# Patient Record
Sex: FEMALE | Race: WHITE | NOT HISPANIC OR LATINO | URBAN - METROPOLITAN AREA
[De-identification: names, ages, dates, MRNs, and addresses within clinical notes are randomized per-mention and may not be internally consistent; named-entity substitution may affect disease eponyms.]

---

## 2023-09-26 PROBLEM — J00 ACUTE NASOPHARYNGITIS: Status: ACTIVE | Noted: 2019-12-27

## 2023-09-26 PROBLEM — E78.00 PURE HYPERCHOLESTEROLEMIA, UNSPECIFIED: Status: ACTIVE | Noted: 2018-07-10

## 2023-09-26 PROBLEM — K90.9 INTESTINAL MALABSORPTION (HHS-HCC): Status: ACTIVE | Noted: 2023-09-26

## 2023-09-26 PROBLEM — M25.511 RIGHT SHOULDER PAIN: Status: ACTIVE | Noted: 2021-06-25

## 2023-09-26 PROBLEM — M19.90 OSTEOARTHRITIS: Status: ACTIVE | Noted: 2023-01-17

## 2023-09-26 PROBLEM — K59.00 CONSTIPATION: Status: ACTIVE | Noted: 2023-09-26

## 2023-09-26 PROBLEM — R73.01 IMPAIRED FASTING GLUCOSE: Status: ACTIVE | Noted: 2019-03-06

## 2023-09-26 PROBLEM — J02.9 ACUTE PHARYNGITIS: Status: ACTIVE | Noted: 2021-06-30

## 2023-09-26 PROBLEM — T23.009A BURN OF HAND: Status: ACTIVE | Noted: 2023-09-26

## 2023-09-26 PROBLEM — Z96.651 STATUS POST RIGHT KNEE REPLACEMENT: Status: ACTIVE | Noted: 2023-09-26

## 2023-09-26 PROBLEM — S46.811A STRAIN OF RIGHT TRAPEZIUS MUSCLE: Status: ACTIVE | Noted: 2023-04-18

## 2023-09-26 PROBLEM — I10 PRIMARY HYPERTENSION: Status: ACTIVE | Noted: 2018-07-02

## 2023-09-26 PROBLEM — E55.9 VITAMIN D DEFICIENCY: Status: ACTIVE | Noted: 2023-09-26

## 2023-09-26 PROBLEM — F41.8 DEPRESSION WITH ANXIETY: Status: ACTIVE | Noted: 2018-11-09

## 2023-09-26 PROBLEM — K85.90 PANCREATITIS (HHS-HCC): Status: ACTIVE | Noted: 2021-05-15

## 2023-09-26 PROBLEM — R05.9 COUGH: Status: ACTIVE | Noted: 2019-12-27

## 2023-09-26 PROBLEM — R15.9 FECAL INCONTINENCE: Status: ACTIVE | Noted: 2021-04-16

## 2023-09-26 PROBLEM — J06.9 VIRAL UPPER RESPIRATORY INFECTION: Status: ACTIVE | Noted: 2020-10-23

## 2023-09-26 PROBLEM — N89.8 VAGINAL DISCHARGE: Status: ACTIVE | Noted: 2020-10-02

## 2023-09-26 PROBLEM — R74.8 ELEVATED LIPASE: Status: ACTIVE | Noted: 2021-05-15

## 2023-09-26 PROBLEM — R39.9 UTI SYMPTOMS: Status: ACTIVE | Noted: 2021-08-19
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
## 2023-09-26 PROBLEM — G47.00 INSOMNIA: Status: ACTIVE | Noted: 2018-07-02

## 2023-09-26 PROBLEM — R03.0 ELEVATED BLOOD PRESSURE READING: Status: ACTIVE | Noted: 2022-09-30

## 2023-09-26 PROBLEM — R12 HEART BURN: Status: ACTIVE | Noted: 2023-07-14

## 2023-09-26 PROBLEM — R73.01 ABNORMAL FASTING GLUCOSE: Status: ACTIVE | Noted: 2018-07-02

## 2023-09-26 PROBLEM — N39.0 UTI (URINARY TRACT INFECTION): Status: ACTIVE | Noted: 2021-08-19

## 2023-09-26 PROBLEM — E66.9 OBESITY: Status: ACTIVE | Noted: 2019-03-06

## 2023-09-26 PROBLEM — D62 ANEMIA DUE TO ACUTE BLOOD LOSS: Status: ACTIVE | Noted: 2023-09-26

## 2023-09-26 PROBLEM — B02.9 SHINGLES: Status: ACTIVE | Noted: 2018-08-17

## 2023-09-26 PROBLEM — K21.9 GASTROESOPHAGEAL REFLUX DISEASE: Status: ACTIVE | Noted: 2023-07-14

## 2023-09-26 PROBLEM — K58.9 IRRITABLE BOWEL: Status: ACTIVE | Noted: 2021-04-16

## 2023-09-26 PROBLEM — R92.8 ABNORMAL MAMMOGRAM: Status: ACTIVE | Noted: 2023-07-06

## 2023-09-26 PROBLEM — G47.33 OBSTRUCTIVE APNEA: Status: ACTIVE | Noted: 2019-07-17

## 2023-09-26 RX ORDER — ASPIRIN 81 MG/1
TABLET ORAL
COMMUNITY
Start: 2021-08-31 | End: 2023-11-06 | Stop reason: WASHOUT

## 2023-09-26 RX ORDER — CYCLOBENZAPRINE HCL 5 MG
TABLET ORAL
COMMUNITY
Start: 2023-04-18 | End: 2023-11-06 | Stop reason: WASHOUT

## 2023-09-26 RX ORDER — ASPIRIN 325 MG
TABLET, DELAYED RELEASE (ENTERIC COATED) ORAL
COMMUNITY
Start: 2021-01-20 | End: 2023-11-06 | Stop reason: WASHOUT

## 2023-09-26 RX ORDER — ACETAMINOPHEN, DIPHENHYDRAMINE HCL, PHENYLEPHRINE HCL 325; 25; 5 MG/1; MG/1; MG/1
0.25 TABLET ORAL NIGHTLY
COMMUNITY
End: 2024-02-22 | Stop reason: WASHOUT

## 2023-09-26 RX ORDER — BIOTIN 10 MG
1 TABLET ORAL DAILY
COMMUNITY

## 2023-09-26 RX ORDER — OMEPRAZOLE 40 MG/1
40 CAPSULE, DELAYED RELEASE ORAL DAILY
COMMUNITY
Start: 2023-07-14 | End: 2023-12-11 | Stop reason: ALTCHOICE

## 2023-09-26 RX ORDER — VENLAFAXINE 50 MG/1
50 TABLET ORAL
COMMUNITY
Start: 2017-02-27 | End: 2023-11-06 | Stop reason: WASHOUT

## 2023-09-26 RX ORDER — ASCORBIC ACID 500 MG
500 TABLET ORAL DAILY
COMMUNITY
End: 2023-11-06 | Stop reason: WASHOUT

## 2023-09-26 RX ORDER — ONDANSETRON 4 MG/1
TABLET, ORALLY DISINTEGRATING ORAL
COMMUNITY
Start: 2021-05-14 | End: 2023-11-06 | Stop reason: WASHOUT

## 2023-09-26 RX ORDER — VENLAFAXINE HYDROCHLORIDE 150 MG/1
150 CAPSULE, EXTENDED RELEASE ORAL DAILY
COMMUNITY
End: 2023-12-11 | Stop reason: ALTCHOICE

## 2023-09-26 RX ORDER — SULFAMETHOXAZOLE AND TRIMETHOPRIM 800; 160 MG/1; MG/1
TABLET ORAL
COMMUNITY
Start: 2021-08-19 | End: 2023-11-06 | Stop reason: WASHOUT

## 2023-09-26 RX ORDER — IBUPROFEN 600 MG/1
TABLET ORAL
COMMUNITY
Start: 2020-12-08 | End: 2023-11-06 | Stop reason: WASHOUT

## 2023-09-26 RX ORDER — ZOLPIDEM TARTRATE 5 MG/1
5 TABLET ORAL NIGHTLY PRN
COMMUNITY
End: 2023-11-06 | Stop reason: WASHOUT

## 2023-09-26 RX ORDER — SOD SULF/POT CHLORIDE/MAG SULF 1.479 G
TABLET ORAL
COMMUNITY
Start: 2021-06-16 | End: 2023-11-06 | Stop reason: WASHOUT

## 2023-09-26 RX ORDER — FERROUS SULFATE 325(65) MG
1 TABLET, DELAYED RELEASE (ENTERIC COATED) ORAL DAILY
COMMUNITY
End: 2023-11-06 | Stop reason: WASHOUT

## 2023-09-26 RX ORDER — CLONAZEPAM 1 MG/1
1 TABLET ORAL NIGHTLY
COMMUNITY
End: 2023-11-17 | Stop reason: SDUPTHER

## 2023-09-26 RX ORDER — HYDROCODONE BITARTRATE AND ACETAMINOPHEN 5; 325 MG/1; MG/1
TABLET ORAL
COMMUNITY
Start: 2021-08-31 | End: 2023-11-06 | Stop reason: WASHOUT

## 2023-09-26 RX ORDER — DOCUSATE SODIUM 100 MG/1
CAPSULE, LIQUID FILLED ORAL
COMMUNITY
Start: 2021-09-01 | End: 2023-11-06 | Stop reason: WASHOUT

## 2023-09-26 RX ORDER — ZOLPIDEM TARTRATE 12.5 MG/1
TABLET, FILM COATED, EXTENDED RELEASE ORAL
COMMUNITY
Start: 2021-10-29

## 2023-09-27 ENCOUNTER — TELEPHONE (OUTPATIENT)
Dept: PRIMARY CARE | Facility: CLINIC | Age: 68
End: 2023-09-27
Payer: MEDICARE

## 2023-10-03 ENCOUNTER — APPOINTMENT (OUTPATIENT)
Dept: PRIMARY CARE | Facility: CLINIC | Age: 68
End: 2023-10-03
Payer: MEDICARE

## 2023-10-04 ENCOUNTER — APPOINTMENT (OUTPATIENT)
Dept: SLEEP MEDICINE | Facility: CLINIC | Age: 68
End: 2023-10-04
Payer: MEDICARE

## 2023-10-19 ENCOUNTER — APPOINTMENT (OUTPATIENT)
Dept: PRIMARY CARE | Facility: CLINIC | Age: 68
End: 2023-10-19
Payer: MEDICARE

## 2023-11-06 ENCOUNTER — OFFICE VISIT (OUTPATIENT)
Dept: SLEEP MEDICINE | Facility: CLINIC | Age: 68
End: 2023-11-06
Payer: MEDICARE

## 2023-11-06 VITALS
BODY MASS INDEX: 42.68 KG/M2 | HEART RATE: 68 BPM | OXYGEN SATURATION: 95 % | DIASTOLIC BLOOD PRESSURE: 76 MMHG | HEIGHT: 64 IN | SYSTOLIC BLOOD PRESSURE: 134 MMHG | WEIGHT: 250 LBS

## 2023-11-06 DIAGNOSIS — G47.00 INSOMNIA, UNSPECIFIED TYPE: ICD-10-CM

## 2023-11-06 DIAGNOSIS — G47.21 CIRCADIAN RHYTHM SLEEP DISORDER, DELAYED SLEEP PHASE TYPE: Primary | ICD-10-CM

## 2023-11-06 DIAGNOSIS — G47.33 OBSTRUCTIVE SLEEP APNEA (ADULT) (PEDIATRIC): ICD-10-CM

## 2023-11-06 PROCEDURE — 3078F DIAST BP <80 MM HG: CPT | Performed by: INTERNAL MEDICINE

## 2023-11-06 PROCEDURE — 99215 OFFICE O/P EST HI 40 MIN: CPT | Performed by: INTERNAL MEDICINE

## 2023-11-06 PROCEDURE — 1160F RVW MEDS BY RX/DR IN RCRD: CPT | Performed by: INTERNAL MEDICINE

## 2023-11-06 PROCEDURE — 1159F MED LIST DOCD IN RCRD: CPT | Performed by: INTERNAL MEDICINE

## 2023-11-06 PROCEDURE — 3075F SYST BP GE 130 - 139MM HG: CPT | Performed by: INTERNAL MEDICINE

## 2023-11-06 PROCEDURE — 1036F TOBACCO NON-USER: CPT | Performed by: INTERNAL MEDICINE

## 2023-11-06 NOTE — PROGRESS NOTES
Subjective   Patient ID: Reyna Escobedo is a 68 y.o. female who presents for a sleep evaluation with concerns of Sleep Apnea and Pap Adherence Followup.  HPI   Prior sleep history:  9/6/2013: Split-night sleep study at [facility]: AHI 92, CPAP 11 cm H2O optimal.  Recommended AutoPap 9 to 15 cm H2O   12/16/2019: Office visit: Dr. Dante Bush: Reports difficulty with initiating and maintaining sleep for 1 year prior.  Was taken off Ambien 1 year ago was not working great as a sleep aid at that time was more difficulty sleeping.  She was placed on lorazepam 1 mg daily nightly which has not helped her either.  Reported history of patient being on SSRI.  He recommended titrating 9 to 15 cm H2O.  Reported consider repeat titration.  Suggested considering Remeron.  Recommended aggressive sleep hygiene counseling.  Referral to insomnia counselor.  Reported sleep correction was discussed and recommended    Current sleep history:  Her best sleep is around 4 AM, but twice a week, she needs to wake up early around 7 AM. She will find herself dozing off around 9 AM.     She takes the melatonin at 9:30 PM    A downloaded compliance report was reviewed and was interpreted by myself as follows:  > 4 hour compliance was 97 %, with an average use of 11 hours and 1 minutes, with a residual AHI 1.3 on AutoPAP 9-15 cm H2O .     Reyna Escobedo reports good benefit from her device.    Sleep schedule  on weekdays / work days:  Usual Bedtime 11 PM  Falls asleep around 11:30 PM  Wake time 7 to 10 AM  Total sleep time average is variable hours/day  Naps  Yes   Refreshing naps:   Yes     Preferred sleeping position: side lying    SLEEP ENVIRONMENT:    Bed partner :  Yes   Pets in bed :   No   BEDROOM TEMP: cool  Noise :   No   Issues with bed comfort :   Yes     Review of Systems         Denies: excessive daytime sleepiness, sleep inertia, late to work/school due to sleepiness, irritability during the day, difficulty with memory or  concentration during the day, and feeling sleepy when driving    SCREENING FOR SLEEP DISORDERS:    MOVEMENT DISORDER SYMPTOMS:    Patient does not have  unusual sensations in their extremities that cause an urge to move them      DENIES  dreams upon falling asleep  hypnagogic/hypnopompic hallucinations  sleep paralysis  sleep attacks  symptoms of cataplexy  sleep walking  kicking, punching, or acting out dreams    ESS 5  JADA 19  FOSQ 38      Past Medical History:   Diagnosis Date    GERD (gastroesophageal reflux disease) Spring 2023    Insomnia Years    Sleep apnea, obstructive March 2006      Patient Active Problem List   Diagnosis    Vitamin D deficiency    Viral upper respiratory infection    Vaginal discharge    UTI symptoms    UTI (urinary tract infection)    Strain of right trapezius muscle    Status post right knee replacement    Shingles    Right shoulder pain    Primary hypertension    Pancreatitis    Osteoarthritis    Obstructive sleep apnea (adult) (pediatric)    Obesity    Irritable bowel    Intestinal malabsorption    Insomnia    Impaired fasting glucose    Pure hypercholesterolemia, unspecified    Heart burn    Gastroesophageal reflux disease    Fecal incontinence    Constipation    Fall    Elevated lipase    Elevated blood pressure reading    Depression with anxiety    Cough    Burn of hand    Anemia due to acute blood loss    Acute pharyngitis    Acute nasopharyngitis    Abnormal mammogram    Abnormal fasting glucose    Circadian rhythm sleep disorder, delayed sleep phase type      History reviewed. No pertinent surgical history.     Current Outpatient Medications:     biotin 10 mg tablet, Take 1 tablet (10 mg) by mouth once daily., Disp: , Rfl:     clonazePAM (KlonoPIN) 1 mg tablet, Take 1 tablet (1 mg) by mouth once daily at bedtime., Disp: , Rfl:     melatonin 10 mg tablet, Take 1 tablet (10 mg) by mouth once daily at bedtime., Disp: , Rfl:     multivit-min/ferrous fumarate (MULTI VITAMIN ORAL), 0  Refill(s), Type: Maintenance, Disp: , Rfl:     venlafaxine XR (Effexor-XR) 150 mg 24 hr capsule, Take 1 capsule (150 mg) by mouth once daily., Disp: , Rfl:     zolpidem CR (Ambien CR) 12.5 mg ER tablet, Take by mouth., Disp: , Rfl:     omeprazole (PriLOSEC) 40 mg DR capsule, Take 1 capsule (40 mg) by mouth once daily., Disp: , Rfl:    Allergies   Allergen Reactions    Penicillins Hives and Rash    Adhesive Rash      Social History     Socioeconomic History    Marital status:      Spouse name: Not on file    Number of children: Not on file    Years of education: Not on file    Highest education level: Not on file   Occupational History    Not on file   Tobacco Use    Smoking status: Former     Packs/day: 1.00     Years: 12.00     Additional pack years: 0.00     Total pack years: 12.00     Types: Cigarettes     Start date: 3/5/1973     Quit date: 3/5/1991     Years since quittin.7    Smokeless tobacco: Never   Substance and Sexual Activity    Alcohol use: Yes     Alcohol/week: 10.0 standard drinks of alcohol     Types: 10 Standard drinks or equivalent per week     Comment: Social Drinking only    Drug use: Never    Sexual activity: Yes     Partners: Male     Birth control/protection: Post-menopausal     Comment: With  only.   Other Topics Concern    Not on file   Social History Narrative    Not on file     Social Determinants of Health     Financial Resource Strain: Not on file   Food Insecurity: Not on file   Transportation Needs: Not on file   Physical Activity: Not on file   Stress: Not on file   Social Connections: Not on file   Intimate Partner Violence: Not on file   Housing Stability: Not on file      SOCIAL HISTORY : smokes 1 pack per day  consumes occasional alcoholic beverages/week  Family History   Problem Relation Name Age of Onset    Cancer Mother Reyna Walker     Alcohol abuse Father Devon Walker Sr.     Heart disease Father Devon Walker Sr.     Alcohol abuse Son [facility]     Cancer Daughter Zita Velez      Lab Results   Component Value Date    IRON 39 09/01/2021    TIBC 219 (L) 09/01/2021    FERRITIN 254 (H) 05/05/2018        Objective   /76   Pulse 68   Ht 1.626 m (5' 4\")   Wt 113 kg (250 lb)   SpO2 95%   BMI 42.91 kg/m²    Physical Exam  PHYSICAL EXAM: GENERAL: alert pleasant and cooperative no acute distress  nasal mucosa  and normal  MODIFIED KHAN SCORE: III  MODIFIED MALLAMPATI SCORE: III (soft and hard palate and base of uvula visible)  midline  0  No collapse of the lateral pharyngeal wall  TONGUE SCALLOPING: midline  NECK EXAM: normal supple no adenopathy  PSYCH EXAM: alert,oriented, in NAD with a full range of affect, normal behavior and no psychotic features  Assessment/Plan   Problem List Items Addressed This Visit             ICD-10-CM    Obstructive sleep apnea (adult) (pediatric) G47.33     Continue with current treatment plan this is working very well for her and is well controlled and unlikely to be causing her sleeping difficulties at this time         Insomnia G47.00     We discussed sleep hygiene and I suspicion is that this is more likely due to a circadian rhythm disorder and not true insomnia due to patient being a night owl         Circadian rhythm sleep disorder, delayed sleep phase type - Primary G47.21     Suspect this is likely.  Recommended she fill out sleep diaries and return in 2 weeks where we will discuss advancing sleep, increasing wake time, sleep hygiene if this is not in fact the case.  The goal would be to reduce her amount of melatonin to 1 mg and slowly wean her off of Ambien as her sleep patterns are regulated.  Also recommended she wake up to an alarm to prevent tendency to drift to a delayed time.  Advancing her sleep schedule can be complex due to her waking earlier for various reasons including going to the gym for swimming and caring for her grandchildren which caused her to wake up earlier than her " natural circadian rhythm

## 2023-11-08 NOTE — ASSESSMENT & PLAN NOTE
We discussed sleep hygiene and I suspicion is that this is more likely due to a circadian rhythm disorder and not true insomnia due to patient being a night owl

## 2023-11-08 NOTE — ASSESSMENT & PLAN NOTE
Suspect this is likely.  Recommended she fill out sleep diaries and return in 2 weeks where we will discuss advancing sleep, increasing wake time, sleep hygiene if this is not in fact the case.  The goal would be to reduce her amount of melatonin to 1 mg and slowly wean her off of Ambien as her sleep patterns are regulated.  Also recommended she wake up to an alarm to prevent tendency to drift to a delayed time.  Advancing her sleep schedule can be complex due to her waking earlier for various reasons including going to the gym for swimming and caring for her grandchildren which caused her to wake up earlier than her natural circadian rhythm

## 2023-11-17 ENCOUNTER — TELEPHONE (OUTPATIENT)
Dept: PRIMARY CARE | Facility: CLINIC | Age: 68
End: 2023-11-17
Payer: MEDICARE

## 2023-11-17 DIAGNOSIS — F41.8 DEPRESSION WITH ANXIETY: ICD-10-CM

## 2023-11-17 RX ORDER — CLONAZEPAM 1 MG/1
1 TABLET ORAL NIGHTLY
Qty: 60 TABLET | Refills: 0 | Status: SHIPPED | OUTPATIENT
Start: 2023-11-17 | End: 2024-01-11 | Stop reason: SDUPTHER

## 2023-11-17 NOTE — TELEPHONE ENCOUNTER
Rx Refill Request Telephone Encounter    Name:  Reyna Escobedo  :  [id number]  Medication Name:  clonazepam 1 mg            Specific Pharmacy location:  [facility] [address]  Date of last appointment:    Date of next appointment:    Best number to reach patient:  [phone]

## 2023-11-20 ENCOUNTER — OFFICE VISIT (OUTPATIENT)
Dept: SLEEP MEDICINE | Facility: CLINIC | Age: 68
End: 2023-11-20
Payer: MEDICARE

## 2023-11-20 VITALS
WEIGHT: 250 LBS | HEART RATE: 70 BPM | HEIGHT: 64 IN | OXYGEN SATURATION: 95 % | BODY MASS INDEX: 42.68 KG/M2 | DIASTOLIC BLOOD PRESSURE: 66 MMHG | SYSTOLIC BLOOD PRESSURE: 108 MMHG

## 2023-11-20 DIAGNOSIS — G47.21 CIRCADIAN RHYTHM SLEEP DISORDER, DELAYED SLEEP PHASE TYPE: ICD-10-CM

## 2023-11-20 DIAGNOSIS — F51.04 PSYCHOPHYSIOLOGICAL INSOMNIA: Primary | ICD-10-CM

## 2023-11-20 PROCEDURE — 3074F SYST BP LT 130 MM HG: CPT | Performed by: INTERNAL MEDICINE

## 2023-11-20 PROCEDURE — 3078F DIAST BP <80 MM HG: CPT | Performed by: INTERNAL MEDICINE

## 2023-11-20 PROCEDURE — 1159F MED LIST DOCD IN RCRD: CPT | Performed by: INTERNAL MEDICINE

## 2023-11-20 PROCEDURE — 1126F AMNT PAIN NOTED NONE PRSNT: CPT | Performed by: INTERNAL MEDICINE

## 2023-11-20 PROCEDURE — 1036F TOBACCO NON-USER: CPT | Performed by: INTERNAL MEDICINE

## 2023-11-20 PROCEDURE — 1160F RVW MEDS BY RX/DR IN RCRD: CPT | Performed by: INTERNAL MEDICINE

## 2023-11-20 PROCEDURE — 99214 OFFICE O/P EST MOD 30 MIN: CPT | Performed by: INTERNAL MEDICINE

## 2023-11-20 RX ORDER — FLUOXETINE HYDROCHLORIDE 20 MG/1
20 CAPSULE ORAL EVERY MORNING
COMMUNITY
Start: 2023-11-14

## 2023-11-20 RX ORDER — VENLAFAXINE 75 MG/1
75 TABLET ORAL 2 TIMES DAILY
COMMUNITY
End: 2023-12-11 | Stop reason: ALTCHOICE

## 2023-11-20 ASSESSMENT — PAIN SCALES - GENERAL: PAINLEVEL: 0-NO PAIN

## 2023-11-20 ASSESSMENT — SLEEP AND FATIGUE QUESTIONNAIRES
HOW LIKELY ARE YOU TO NOD OFF OR FALL ASLEEP WHILE WATCHING TV: WOULD NEVER DOZE
HOW LIKELY ARE YOU TO NOD OFF OR FALL ASLEEP WHILE SITTING AND READING: WOULD NEVER DOZE
HOW LIKELY ARE YOU TO NOD OFF OR FALL ASLEEP WHEN YOU ARE A PASSENGER IN A CAR FOR AN HOUR WITHOUT A BREAK: HIGH CHANCE OF DOZING
SITING INACTIVE IN A PUBLIC PLACE LIKE A CLASS ROOM OR A MOVIE THEATER: WOULD NEVER DOZE
HOW LIKELY ARE YOU TO NOD OFF OR FALL ASLEEP WHILE LYING DOWN TO REST IN THE AFTERNOON WHEN CIRCUMSTANCES PERMIT: HIGH CHANCE OF DOZING
HOW LIKELY ARE YOU TO NOD OFF OR FALL ASLEEP WHILE SITTING AND TALKING TO SOMEONE: WOULD NEVER DOZE
ESS-CHAD TOTAL SCORE: 6
HOW LIKELY ARE YOU TO NOD OFF OR FALL ASLEEP WHILE SITTING QUIETLY AFTER LUNCH WITHOUT ALCOHOL: WOULD NEVER DOZE
HOW LIKELY ARE YOU TO NOD OFF OR FALL ASLEEP IN A CAR, WHILE STOPPED FOR A FEW MINUTES IN TRAFFIC: WOULD NEVER DOZE

## 2023-11-20 NOTE — PROGRESS NOTES
"    Subjective   Patient ID: Reyna Escobedo is a 68 y.o. female who presents for Sleep Apnea and Pap Adherence Followup.  HPI    Prior Sleep History:  9/6/2013: Split-night sleep study at [facility]: AHI 92, CPAP 11 cm H2O optimal.  Recommended AutoPap 9 to 15 cm H2O   12/16/2019: Office visit: Dr. Dante Bush: Reports difficulty with initiating and maintaining sleep for 1 year prior.  Was taken off Ambien 1 year ago was not working great as a sleep aid at that time was more difficulty sleeping.  She was placed on lorazepam 1 mg daily nightly which has not helped her either.  Reported history of patient being on SSRI.  He recommended titrating 9 to 15 cm H2O.  Reported consider repeat titration.  Suggested considering Remeron.  Recommended aggressive sleep hygiene counseling.  Referral to insomnia counselor.  Reported sleep correction was discussed and recommended  11/6/2023: Office Visit: Dr. Anastacio Norton:  - Awake around 4 AM.  Twice a week she is to get up around 7 AM.  Find herself dozing off around 9 AM.  Takes melatonin at 9:30 PM.  Sleep scheduleUsual Bedtime 11 PM. Falls asleep around 11:30 PM. Wake time 7 to 10 AM. Total sleep time average is variable hours/day. Naps  Yes .Refreshing naps:   Yes     Current Sleep History:  Presents today to review sleep diaries.  Suspicion is that she has a delayed circadian rhythm.  Discussed reducing melatonin and slowly weaning off Ambien as sleep patterns regulate.  Venting sleep schedule due to other obligations.  Review of sleep diary indicates a tendency towards delayed sleep.  Has single nocturnal awakening frequently.  She has been getting out of bed.  She also does take naps most frequently once a day for up to 2 hours    ESS: 6     Review of Systems  Review of systems negative except as per HPI  Objective   /66   Pulse 70   Ht 1.626 m (5' 4\")   Wt 113 kg (250 lb)   SpO2 95%   BMI 42.91 kg/m²    Physical Exam  PHYSICAL EXAM: GENERAL: alert pleasant " and cooperative no acute distress  PSYCH EXAM: alert,oriented, in NAD with a full range of affect, normal behavior and no psychotic features    Lab Results   Component Value Date    IRON 39 09/01/2021    TIBC 219 (L) 09/01/2021    FERRITIN 254 (H) 05/05/2018        Assessment/Plan   Problem List Items Addressed This Visit             ICD-10-CM    Insomnia - Primary G47.00     Has had some improvement in her overall sleep efficiency.  Has been practicing stimulus control by reading a book which has been helpful when waking up during the middle of the night.  She continues to use clonazepam to help her fall asleep.  She reports she does not use Ambien frequently.  Sleep diaries indicate long naps and early bedtimes will affect her sleep quality.  Nighttime awakenings and 6 to 8 hours of sleep per night.  Recommended she continue to follow stimulus control practices and to try to maintain a consistent bedtime of 12 AM.  Avoid sleeping in past 8 AM and if she would like to advance her clock then we discussed waking up at 7:30 AM to an alarm with gradual advancement.  Sleep diaries for the next 2 weeks and reduce naps to 15 to 30 minutes.  She has reduced caffeine intake overall         Circadian rhythm sleep disorder, delayed sleep phase type G47.21     Review of sleep diaries do indicate the patient has a delayed phase sleep disorder.  Tendency towards the evening this

## 2023-11-20 NOTE — ASSESSMENT & PLAN NOTE
Has had some improvement in her overall sleep efficiency.  Has been practicing stimulus control by reading a book which has been helpful when waking up during the middle of the night.  She continues to use clonazepam to help her fall asleep.  She reports she does not use Ambien frequently.  Sleep diaries indicate long naps and early bedtimes will affect her sleep quality.  Nighttime awakenings and 6 to 8 hours of sleep per night.  Recommended she continue to follow stimulus control practices and to try to maintain a consistent bedtime of 12 AM.  Avoid sleeping in past 8 AM and if she would like to advance her clock then we discussed waking up at 7:30 AM to an alarm with gradual advancement.  Sleep diaries for the next 2 weeks and reduce naps to 15 to 30 minutes.  She has reduced caffeine intake overall   Yes

## 2023-11-20 NOTE — ASSESSMENT & PLAN NOTE
Review of sleep diaries do indicate the patient has a delayed phase sleep disorder.  Tendency towards the evening this

## 2023-12-11 ENCOUNTER — TELEPHONE (OUTPATIENT)
Dept: PRIMARY CARE | Facility: CLINIC | Age: 68
End: 2023-12-11

## 2023-12-11 ENCOUNTER — OFFICE VISIT (OUTPATIENT)
Dept: SLEEP MEDICINE | Facility: CLINIC | Age: 68
End: 2023-12-11
Payer: MEDICARE

## 2023-12-11 VITALS
WEIGHT: 250 LBS | HEART RATE: 66 BPM | DIASTOLIC BLOOD PRESSURE: 70 MMHG | SYSTOLIC BLOOD PRESSURE: 136 MMHG | HEIGHT: 64 IN | OXYGEN SATURATION: 96 % | BODY MASS INDEX: 42.68 KG/M2

## 2023-12-11 DIAGNOSIS — G47.21 CIRCADIAN RHYTHM SLEEP DISORDER, DELAYED SLEEP PHASE TYPE: ICD-10-CM

## 2023-12-11 DIAGNOSIS — F51.04 PSYCHOPHYSIOLOGICAL INSOMNIA: ICD-10-CM

## 2023-12-11 DIAGNOSIS — G47.33 OBSTRUCTIVE SLEEP APNEA (ADULT) (PEDIATRIC): Primary | ICD-10-CM

## 2023-12-11 DIAGNOSIS — K21.9 GASTROESOPHAGEAL REFLUX DISEASE WITHOUT ESOPHAGITIS: ICD-10-CM

## 2023-12-11 PROCEDURE — 1126F AMNT PAIN NOTED NONE PRSNT: CPT | Performed by: INTERNAL MEDICINE

## 2023-12-11 PROCEDURE — 1160F RVW MEDS BY RX/DR IN RCRD: CPT | Performed by: INTERNAL MEDICINE

## 2023-12-11 PROCEDURE — 99214 OFFICE O/P EST MOD 30 MIN: CPT | Performed by: INTERNAL MEDICINE

## 2023-12-11 PROCEDURE — 3078F DIAST BP <80 MM HG: CPT | Performed by: INTERNAL MEDICINE

## 2023-12-11 PROCEDURE — 1036F TOBACCO NON-USER: CPT | Performed by: INTERNAL MEDICINE

## 2023-12-11 PROCEDURE — 3075F SYST BP GE 130 - 139MM HG: CPT | Performed by: INTERNAL MEDICINE

## 2023-12-11 PROCEDURE — 1159F MED LIST DOCD IN RCRD: CPT | Performed by: INTERNAL MEDICINE

## 2023-12-11 ASSESSMENT — SLEEP AND FATIGUE QUESTIONNAIRES
SITING INACTIVE IN A PUBLIC PLACE LIKE A CLASS ROOM OR A MOVIE THEATER: WOULD NEVER DOZE
HOW LIKELY ARE YOU TO NOD OFF OR FALL ASLEEP IN A CAR, WHILE STOPPED FOR A FEW MINUTES IN TRAFFIC: WOULD NEVER DOZE
HOW LIKELY ARE YOU TO NOD OFF OR FALL ASLEEP WHILE SITTING QUIETLY AFTER LUNCH WITHOUT ALCOHOL: HIGH CHANCE OF DOZING
HOW LIKELY ARE YOU TO NOD OFF OR FALL ASLEEP WHILE SITTING AND TALKING TO SOMEONE: WOULD NEVER DOZE
HOW LIKELY ARE YOU TO NOD OFF OR FALL ASLEEP WHILE SITTING AND READING: WOULD NEVER DOZE
ESS-CHAD TOTAL SCORE: 9
HOW LIKELY ARE YOU TO NOD OFF OR FALL ASLEEP WHILE WATCHING TV: WOULD NEVER DOZE
HOW LIKELY ARE YOU TO NOD OFF OR FALL ASLEEP WHEN YOU ARE A PASSENGER IN A CAR FOR AN HOUR WITHOUT A BREAK: HIGH CHANCE OF DOZING
HOW LIKELY ARE YOU TO NOD OFF OR FALL ASLEEP WHILE LYING DOWN TO REST IN THE AFTERNOON WHEN CIRCUMSTANCES PERMIT: HIGH CHANCE OF DOZING

## 2023-12-11 ASSESSMENT — PAIN SCALES - GENERAL: PAINLEVEL: 0-NO PAIN

## 2023-12-11 NOTE — ASSESSMENT & PLAN NOTE
This has improved with more consistent sleep schedule.  She does still get sleepy in the early morning which would indicate potentially medication effect and persistence of delayed circadian rhythm which will likely improve over time.  Recommended that she reduce the dose of melatonin from 10 mg down to 5 mg as that is likely causing daytime sedation

## 2023-12-11 NOTE — TELEPHONE ENCOUNTER
Rx Refill Request Telephone Encounter    Name:  Reyna Perez Gregg  :  [id number]  Medication Name:   omeprazole 40 mg    Specific Pharmacy location:  [facility] [address]/[place]  Best number to reach patient:  [phone]

## 2023-12-11 NOTE — ASSESSMENT & PLAN NOTE
Well-controlled  - Reyna Escobedo  has sleep apnea and requires treatment.  - Reyna Escobedo demonstrates good compliance and benefit from PAP therapy  - Continue Auto PAP 9-15 cmH20 through [employer].  - Order for renewal of PAP supplies placed

## 2023-12-11 NOTE — ASSESSMENT & PLAN NOTE
This has likely overall improved due to more consistent bedtimes.  She has less issues with sleep onset but does continue to have some sleep maintenance issues suspect due to napping behavior.  Goal is to try to reduce and eliminate naps entirely by improving daytime functioning.  Recommended she fill out sleep diaries to bring to the next visit to review changes

## 2023-12-11 NOTE — PROGRESS NOTES
Subjective   Patient ID: Reyna Escobedo is a 68 y.o. female who presents for Sleep Apnea and Pap Adherence Followup.  HPI    Prior Sleep History:  9/6/2013: Split-night sleep study at [facility]: AHI 92, CPAP 11 cm H2O optimal.  Recommended AutoPap 9 to 15 cm H2O   12/16/2019: Office visit: Dr. Dante Bush: Reports difficulty with initiating and maintaining sleep for 1 year prior.  Was taken off Ambien 1 year ago was not working great as a sleep aid at that time was more difficulty sleeping.  She was placed on lorazepam 1 mg daily nightly which has not helped her either.  Reported history of patient being on SSRI.  He recommended titrating 9 to 15 cm H2O.  Reported consider repeat titration.  Suggested considering Remeron.  Recommended aggressive sleep hygiene counseling.  Referral to insomnia counselor.  Reported sleep correction was discussed and recommended  11/6/2023: Office Visit: Dr. Anastacio Norton:  Awake around 4 AM.  Twice a week she is to get up around 7 AM.  Find herself dozing off around 9 AM.  Takes melatonin at 9:30 PM.  Sleep scheduleUsual Bedtime 11 PM. Falls asleep around 11:30 PM. Wake time 7 to 10 AM. Total sleep time average is variable hours/day. Naps  Yes .Refreshing naps:   Yes   11/20/2023: Office Visit: Dr. Anastacio Norton: Presents today to review sleep diaries.  Suspicion is that she has a delayed circadian rhythm.  Discussed reducing melatonin and slowly weaning off Ambien as sleep patterns regulate.  Venting sleep schedule due to other obligations.  Review of sleep diary indicates a tendency towards delayed sleep.  Has single nocturnal awakening frequently.  She has been getting out of bed.  She also does take naps most frequently once a day for up to 2 hours    Current Sleep History:  Review of sleep diary indicates patient has overall improved her sleep schedule.  With wake time around 730 to 8 AM.  She is occasionally taking naps in the evening or the early afternoon on most days.   "She continues to have nocturnal awakenings with difficulty getting back to sleep on most nights.  Caffeine use has decreased she does exercise in the afternoon occasionally.  She has been exercising which does help consolidate sleep.  Her bedtimes have been more consistent and she is getting somewhere between 7 to 8 hours of sleep at night and taking a 30-minute nap    A downloaded compliance report was reviewed and was interpreted by myself as follows:  > 4 hour compliance was 97%, with an average use of 9 hours and 34 minutes, with a residual AHI 0.9 on AutoPap 9 to 15 cm H2O.  She is off venlafaxine and on prozac now.   She did not do well with caffeine it kept her awake  She is taking the melatonin, clonazepam, biotin typically takes it around 9:30 PM.      Reyna Escobedo reports good benefit from her device.     ESS: 9     Review of Systems  Review of systems negative except as per HPI  Objective   /70   Pulse 66   Ht 1.626 m (5' 4\")   Wt 113 kg (250 lb)   SpO2 96%   BMI 42.91 kg/m²    Physical Exam  PHYSICAL EXAM: GENERAL: alert pleasant and cooperative no acute distress  PSYCH EXAM: alert,oriented, in NAD with a full range of affect, normal behavior and no psychotic features    Lab Results   Component Value Date    IRON 39 09/01/2021    TIBC 219 (L) 09/01/2021    FERRITIN 254 (H) 05/05/2018        Assessment/Plan   Problem List Items Addressed This Visit             ICD-10-CM    Obstructive sleep apnea (adult) (pediatric) - Primary G47.33     Well-controlled  - Reyna Escobedo  has sleep apnea and requires treatment.  - Reyna Escobedo demonstrates good compliance and benefit from PAP therapy  - Continue Auto PAP 9-15 cmH20 through [employer].  - Order for renewal of PAP supplies placed          Relevant Orders    Positive Airway Pressure (PAP) Therapy    Insomnia G47.00     This has likely overall improved due to more consistent bedtimes.  She has less issues with " sleep onset but does continue to have some sleep maintenance issues suspect due to napping behavior.  Goal is to try to reduce and eliminate naps entirely by improving daytime functioning         Circadian rhythm sleep disorder, delayed sleep phase type G47.21     This has improved with more consistent sleep schedule.  She does still get sleepy in the early morning which would indicate potentially medication effect and persistence of delayed circadian rhythm which will likely improve over time.  Recommended that she reduce the dose of melatonin from 10 mg down to 5 mg as that is likely causing daytime sedation

## 2023-12-12 RX ORDER — OMEPRAZOLE 40 MG/1
40 CAPSULE, DELAYED RELEASE ORAL
Qty: 90 CAPSULE | Refills: 2 | Status: SHIPPED | OUTPATIENT
Start: 2023-12-12 | End: 2024-12-11

## 2024-01-11 ENCOUNTER — TELEPHONE (OUTPATIENT)
Dept: PRIMARY CARE | Facility: CLINIC | Age: 69
End: 2024-01-11

## 2024-01-11 ENCOUNTER — OFFICE VISIT (OUTPATIENT)
Dept: SLEEP MEDICINE | Facility: CLINIC | Age: 69
End: 2024-01-11
Payer: MEDICARE

## 2024-01-11 VITALS
DIASTOLIC BLOOD PRESSURE: 74 MMHG | OXYGEN SATURATION: 96 % | HEIGHT: 64 IN | WEIGHT: 250 LBS | HEART RATE: 60 BPM | BODY MASS INDEX: 42.68 KG/M2 | SYSTOLIC BLOOD PRESSURE: 118 MMHG

## 2024-01-11 DIAGNOSIS — G47.33 OBSTRUCTIVE SLEEP APNEA (ADULT) (PEDIATRIC): ICD-10-CM

## 2024-01-11 DIAGNOSIS — F51.04 PSYCHOPHYSIOLOGICAL INSOMNIA: ICD-10-CM

## 2024-01-11 DIAGNOSIS — G47.21 CIRCADIAN RHYTHM SLEEP DISORDER, DELAYED SLEEP PHASE TYPE: Primary | ICD-10-CM

## 2024-01-11 DIAGNOSIS — F41.8 DEPRESSION WITH ANXIETY: ICD-10-CM

## 2024-01-11 PROCEDURE — 1160F RVW MEDS BY RX/DR IN RCRD: CPT | Performed by: INTERNAL MEDICINE

## 2024-01-11 PROCEDURE — 1036F TOBACCO NON-USER: CPT | Performed by: INTERNAL MEDICINE

## 2024-01-11 PROCEDURE — 99214 OFFICE O/P EST MOD 30 MIN: CPT | Performed by: INTERNAL MEDICINE

## 2024-01-11 PROCEDURE — 1159F MED LIST DOCD IN RCRD: CPT | Performed by: INTERNAL MEDICINE

## 2024-01-11 PROCEDURE — 3078F DIAST BP <80 MM HG: CPT | Performed by: INTERNAL MEDICINE

## 2024-01-11 PROCEDURE — 3074F SYST BP LT 130 MM HG: CPT | Performed by: INTERNAL MEDICINE

## 2024-01-11 PROCEDURE — 1126F AMNT PAIN NOTED NONE PRSNT: CPT | Performed by: INTERNAL MEDICINE

## 2024-01-11 RX ORDER — CLONAZEPAM 1 MG/1
1 TABLET ORAL NIGHTLY
Qty: 5 TABLET | Refills: 0 | Status: SHIPPED | OUTPATIENT
Start: 2024-01-11 | End: 2024-01-24 | Stop reason: SDUPTHER

## 2024-01-11 ASSESSMENT — SLEEP AND FATIGUE QUESTIONNAIRES
HOW LIKELY ARE YOU TO NOD OFF OR FALL ASLEEP WHILE LYING DOWN TO REST IN THE AFTERNOON WHEN CIRCUMSTANCES PERMIT: HIGH CHANCE OF DOZING
HOW LIKELY ARE YOU TO NOD OFF OR FALL ASLEEP WHILE SITTING AND TALKING TO SOMEONE: WOULD NEVER DOZE
HOW LIKELY ARE YOU TO NOD OFF OR FALL ASLEEP IN A CAR, WHILE STOPPED FOR A FEW MINUTES IN TRAFFIC: WOULD NEVER DOZE
HOW LIKELY ARE YOU TO NOD OFF OR FALL ASLEEP WHEN YOU ARE A PASSENGER IN A CAR FOR AN HOUR WITHOUT A BREAK: MODERATE CHANCE OF DOZING
HOW LIKELY ARE YOU TO NOD OFF OR FALL ASLEEP WHILE WATCHING TV: WOULD NEVER DOZE
ESS-CHAD TOTAL SCORE: 5
HOW LIKELY ARE YOU TO NOD OFF OR FALL ASLEEP WHILE SITTING QUIETLY AFTER LUNCH WITHOUT ALCOHOL: WOULD NEVER DOZE
HOW LIKELY ARE YOU TO NOD OFF OR FALL ASLEEP WHILE SITTING AND READING: WOULD NEVER DOZE
SITING INACTIVE IN A PUBLIC PLACE LIKE A CLASS ROOM OR A MOVIE THEATER: WOULD NEVER DOZE

## 2024-01-11 ASSESSMENT — PAIN SCALES - GENERAL: PAINLEVEL: 0-NO PAIN

## 2024-01-11 NOTE — ASSESSMENT & PLAN NOTE
This has improved with stimulus control measures which she has been following reading a book when having difficulty falling asleep.  Also increasing wake time.  Improve sleep hygiene avoiding electronics and sleep diaries have led to shorter wake after sleep onset latencies as reported by patient.  She has reduced melatonin and realized that it has not had much effect on her ability to sleep and is willing to reduce again.  After we have her off melatonin we will slowly start to work on reducing her dose of clonazepam with a goal of eventually weaning her off of this entirely

## 2024-01-11 NOTE — PROGRESS NOTES
Subjective   Patient ID: Reyna Escobedo is a 68 y.o. female who presents for No chief complaint on file..  HPI    Prior Sleep History:  9/6/2013: Split-night sleep study at [facility]: AHI 92, CPAP 11 cm H2O optimal.  Recommended AutoPap 9 to 15 cm H2O   12/16/2019: Office visit: Dr. Dante Bush: Reports difficulty with initiating and maintaining sleep for 1 year prior.  Was taken off Ambien 1 year ago was not working great as a sleep aid at that time was more difficulty sleeping.  She was placed on lorazepam 1 mg daily nightly which has not helped her either.  Reported history of patient being on SSRI.  He recommended titrating 9 to 15 cm H2O.  Reported consider repeat titration.  Suggested considering Remeron.  Recommended aggressive sleep hygiene counseling.  Referral to insomnia counselor.  Reported sleep correction was discussed and recommended  11/6/2023: Office Visit: Dr. Anastacio Norton:  Awake around 4 AM.  Twice a week she is to get up around 7 AM.  Find herself dozing off around 9 AM.  Takes melatonin at 9:30 PM.  Sleep scheduleUsual Bedtime 11 PM. Falls asleep around 11:30 PM. Wake time 7 to 10 AM. Total sleep time average is variable hours/day. Naps  Yes .Refreshing naps:   Yes   11/20/2023: Office Visit: Dr. Anastacio Norton: Presents today to review sleep diaries.  Suspicion is that she has a delayed circadian rhythm.  Discussed reducing melatonin and slowly weaning off Ambien as sleep patterns regulate.  Venting sleep schedule due to other obligations.  Review of sleep diary indicates a tendency towards delayed sleep.  Has single nocturnal awakening frequently.  She has been getting out of bed.  She also does take naps most frequently once a day for up to 2 hours   12/11/2023: Office Visit: Dr. Anastacio Norton: Review of sleep diary indicates patient has overall improved her sleep schedule.  With wake time around 730 to 8 AM.  She is occasionally taking naps in the evening or the early afternoon on  "most days.  She continues to have nocturnal awakenings with difficulty getting back to sleep on most nights.  Caffeine use has decreased she does exercise in the afternoon occasionally.  She has been exercising which does help consolidate sleep.  Her bedtimes have been more consistent and she is getting somewhere between 7 to 8 hours of sleep at night and taking a 30-minute nap  She is off venlafaxine and on prozac now. . She did not do well with caffeine it kept her awake. She is taking the melatonin, clonazepam, biotin typically takes it around 9:30 PM.      Current Sleep History:  Here to review her sleep diary since her last visit.  Sleep diaries.  From 12/11/2023 to 1/11/2024.  Sleep diaries indicate average of 7 hours of sleep.  Napping behavior.  Nocturnal awakenings occasionally that appear to be shorter in duration than previously.  Continued dependence on medication for sleep.      A downloaded compliance report was reviewed and was interpreted by myself as follows:  > 4 hour compliance was 100 %, with an average use of 10 hours and 9 minutes, with a residual AHI 1.0 on AutoPAP 9-15 cmH2O .     Reyna Ana Escobedo reports good benefit from her device.    ESS: 5     Review of Systems  Review of systems negative except as per HPI  Objective   /74   Pulse 60   Ht 1.626 m (5' 4\")   Wt 113 kg (250 lb)   SpO2 96%   BMI 42.91 kg/m²    Physical Exam  PHYSICAL EXAM: GENERAL: alert pleasant and cooperative no acute distress  PSYCH EXAM: alert,oriented, in NAD with a full range of affect, normal behavior and no psychotic features    Lab Results   Component Value Date    IRON 39 09/01/2021    TIBC 219 (L) 09/01/2021    FERRITIN 254 (H) 05/05/2018        Assessment/Plan   Problem List Items Addressed This Visit             ICD-10-CM    Obstructive sleep apnea (adult) (pediatric) G47.33     This is well-controlled we will continue on AutoPap 9-15 centimeters H2O         Insomnia G47.00     This has improved " with stimulus control measures which she has been following reading a book when having difficulty falling asleep.  Also increasing wake time.  Improve sleep hygiene avoiding electronics and sleep diaries have led to shorter wake after sleep onset latencies as reported by patient.  She has reduced melatonin and realized that it has not had much effect on her ability to sleep and is willing to reduce again.  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  Current bedtime is 11 PM to 7 AM which works with her variable schedule

## 2024-01-11 NOTE — ASSESSMENT & PLAN NOTE
She is back to a normal physiologic wake time with advancement of circadian rhythm.  Current bedtime is 11 PM to 7 AM which works with her variable schedule

## 2024-01-17 ENCOUNTER — TELEPHONE (OUTPATIENT)
Dept: PRIMARY CARE | Facility: CLINIC | Age: 69
End: 2024-01-17
Payer: MEDICARE

## 2024-01-17 NOTE — TELEPHONE ENCOUNTER
Please let her know that she should discuss the refill with her sleep doctor - Dr. Tucker - who has been managing her use of the clonazepam.

## 2024-01-17 NOTE — TELEPHONE ENCOUNTER
Patient called [facility]. She usually gets a prescription for clonazepam 1 mg with a 60 day supply. When she went to  the medication it was only a 5 day supply.    Patient Pharmacy: [facility] on [address]  Patient Contact Number: Mobile

## 2024-01-24 ENCOUNTER — LAB (OUTPATIENT)
Dept: LAB | Facility: LAB | Age: 69
End: 2024-01-24
Payer: MEDICARE

## 2024-01-24 ENCOUNTER — OFFICE VISIT (OUTPATIENT)
Dept: SLEEP MEDICINE | Facility: CLINIC | Age: 69
End: 2024-01-24
Payer: MEDICARE

## 2024-01-24 ENCOUNTER — TELEPHONE (OUTPATIENT)
Dept: PRIMARY CARE | Facility: CLINIC | Age: 69
End: 2024-01-24

## 2024-01-24 VITALS
SYSTOLIC BLOOD PRESSURE: 128 MMHG | DIASTOLIC BLOOD PRESSURE: 66 MMHG | HEART RATE: 63 BPM | WEIGHT: 250 LBS | BODY MASS INDEX: 47.2 KG/M2 | OXYGEN SATURATION: 95 % | HEIGHT: 61 IN

## 2024-01-24 DIAGNOSIS — Z79.899 MEDICATION MANAGEMENT: ICD-10-CM

## 2024-01-24 DIAGNOSIS — F41.8 DEPRESSION WITH ANXIETY: ICD-10-CM

## 2024-01-24 DIAGNOSIS — Z79.899 MEDICATION MANAGEMENT: Primary | ICD-10-CM

## 2024-01-24 DIAGNOSIS — F51.04 PSYCHOPHYSIOLOGICAL INSOMNIA: ICD-10-CM

## 2024-01-24 LAB
AMPHETAMINES UR QL SCN: NORMAL
BARBITURATES UR QL SCN: NORMAL
BENZODIAZ UR QL SCN: NORMAL
BZE UR QL SCN: NORMAL
CANNABINOIDS UR QL SCN: NORMAL
FENTANYL+NORFENTANYL UR QL SCN: NORMAL
OPIATES UR QL SCN: NORMAL
OXYCODONE+OXYMORPHONE UR QL SCN: NORMAL
PCP UR QL SCN: NORMAL

## 2024-01-24 PROCEDURE — 1160F RVW MEDS BY RX/DR IN RCRD: CPT | Performed by: INTERNAL MEDICINE

## 2024-01-24 PROCEDURE — 1159F MED LIST DOCD IN RCRD: CPT | Performed by: INTERNAL MEDICINE

## 2024-01-24 PROCEDURE — 99214 OFFICE O/P EST MOD 30 MIN: CPT | Performed by: INTERNAL MEDICINE

## 2024-01-24 PROCEDURE — 1036F TOBACCO NON-USER: CPT | Performed by: INTERNAL MEDICINE

## 2024-01-24 PROCEDURE — 3074F SYST BP LT 130 MM HG: CPT | Performed by: INTERNAL MEDICINE

## 2024-01-24 PROCEDURE — 3078F DIAST BP <80 MM HG: CPT | Performed by: INTERNAL MEDICINE

## 2024-01-24 PROCEDURE — 1126F AMNT PAIN NOTED NONE PRSNT: CPT | Performed by: INTERNAL MEDICINE

## 2024-01-24 PROCEDURE — 80307 DRUG TEST PRSMV CHEM ANLYZR: CPT

## 2024-01-24 RX ORDER — CLONAZEPAM 1 MG/1
1 TABLET ORAL NIGHTLY
Qty: 7 TABLET | Refills: 0 | Status: SHIPPED | OUTPATIENT
Start: 2024-01-24 | End: 2024-01-29 | Stop reason: WASHOUT

## 2024-01-24 ASSESSMENT — SLEEP AND FATIGUE QUESTIONNAIRES
SITING INACTIVE IN A PUBLIC PLACE LIKE A CLASS ROOM OR A MOVIE THEATER: WOULD NEVER DOZE
HOW LIKELY ARE YOU TO NOD OFF OR FALL ASLEEP IN A CAR, WHILE STOPPED FOR A FEW MINUTES IN TRAFFIC: WOULD NEVER DOZE
HOW LIKELY ARE YOU TO NOD OFF OR FALL ASLEEP WHEN YOU ARE A PASSENGER IN A CAR FOR AN HOUR WITHOUT A BREAK: MODERATE CHANCE OF DOZING
HOW LIKELY ARE YOU TO NOD OFF OR FALL ASLEEP WHILE SITTING AND READING: WOULD NEVER DOZE
HOW LIKELY ARE YOU TO NOD OFF OR FALL ASLEEP WHILE SITTING QUIETLY AFTER LUNCH WITHOUT ALCOHOL: WOULD NEVER DOZE
HOW LIKELY ARE YOU TO NOD OFF OR FALL ASLEEP WHILE WATCHING TV: WOULD NEVER DOZE
HOW LIKELY ARE YOU TO NOD OFF OR FALL ASLEEP WHILE SITTING AND TALKING TO SOMEONE: WOULD NEVER DOZE
HOW LIKELY ARE YOU TO NOD OFF OR FALL ASLEEP WHILE LYING DOWN TO REST IN THE AFTERNOON WHEN CIRCUMSTANCES PERMIT: HIGH CHANCE OF DOZING
ESS-CHAD TOTAL SCORE: 5

## 2024-01-24 ASSESSMENT — PAIN SCALES - GENERAL: PAINLEVEL: 0-NO PAIN

## 2024-01-24 NOTE — PROGRESS NOTES
Subjective   Patient ID: Reyna Escobedo is a 68 y.o. female who presents for Sleep Apnea and Needs Pap Supplies/new Pap Machine (New mask / new tubing).  HPI    Prior Sleep History:  9/6/2013: Split-night sleep study at [facility]: AHI 92, CPAP 11 cm H2O optimal.  Recommended AutoPap 9 to 15 cm H2O   12/16/2019: Office visit: Dr. Dante Bush: Reports difficulty with initiating and maintaining sleep for 1 year prior.  Was taken off Ambien 1 year ago was not working great as a sleep aid at that time was more difficulty sleeping.  She was placed on lorazepam 1 mg daily nightly which has not helped her either.  Reported history of patient being on SSRI.  He recommended titrating 9 to 15 cm H2O.  Reported consider repeat titration.  Suggested considering Remeron.  Recommended aggressive sleep hygiene counseling.  Referral to insomnia counselor.  Reported sleep correction was discussed and recommended  11/6/2023: Office Visit: Dr. Anastacio Norton:  Awake around 4 AM.  Twice a week she is to get up around 7 AM.  Find herself dozing off around 9 AM.  Takes melatonin at 9:30 PM.  Sleep scheduleUsual Bedtime 11 PM. Falls asleep around 11:30 PM. Wake time 7 to 10 AM. Total sleep time average is variable hours/day. Naps  Yes .Refreshing naps:   Yes   11/20/2023: Office Visit: Dr. Anastacio Norton: Presents today to review sleep diaries.  Suspicion is that she has a delayed circadian rhythm.  Discussed reducing melatonin and slowly weaning off Ambien as sleep patterns regulate.  Venting sleep schedule due to other obligations.  Review of sleep diary indicates a tendency towards delayed sleep.  Has single nocturnal awakening frequently.  She has been getting out of bed.  She also does take naps most frequently once a day for up to 2 hours   12/11/2023: Office Visit: Dr. Anastacio Norton: Review of sleep diary indicates patient has overall improved her sleep schedule.  With wake time around 730 to 8 AM.  She is occasionally  taking naps in the evening or the early afternoon on most days.  She continues to have nocturnal awakenings with difficulty getting back to sleep on most nights.  Caffeine use has decreased she does exercise in the afternoon occasionally.  She has been exercising which does help consolidate sleep.  Her bedtimes have been more consistent and she is getting somewhere between 7 to 8 hours of sleep at night and taking a 30-minute nap  She is off venlafaxine and on prozac now. . She did not do well with caffeine it kept her awake. She is taking the melatonin, clonazepam, biotin typically takes it around 9:30 PM.    1/11/2024: Office Visit Dr. Anastacio Norton: Here to review her sleep diary since her last visit.  Sleep diaries.  From 12/11/2023 to 1/11/2024.  Sleep diaries indicate average of 7 hours of sleep.  Napping behavior.  Nocturnal awakenings occasionally that appear to be shorter in duration than previously.  Continued dependence on medication for sleep. A downloaded compliance report was reviewed and was interpreted by myself as follows:  > 4 hour compliance was 100 %, with an average use of 10 hours and 9 minutes, with a residual AHI 1.0 on AutoPAP 9-15 cmH2O .    Current Sleep History:  Per patient she called stating her PCP would longer prescribe her clonazepam and recommended that her sleep physician fill the prescription without notification from her PCP. She was only provided 5 tabs of clonazepam. She is here to discuss the clonazepam prescription which she is using to manage her insomnia. This was originally prescribed by a therapist and her PCP had taken over the prescription when the patient stopped seeing the therapist. She has also been prescribed zolpidem which she takes PRN. We did not discuss the zolpidem at today's visit, other then me recommending that she does not take this with the clonazepam due to risk over unintentional overdose and death.     OARRS:  Anastacio Norton MD on 1/24/2024  1:54  "PM  I have personally reviewed the OARRS report for Reyna Escobedo. I have considered the risks of abuse, dependence, addiction and diversion, I believe that it is clinically appropriate for Reyna Escobedo to be prescribed this medication, and I have the following concerns that she may begin to withdraw from the medication as she was not provided sufficient medication by her previous prescribing provider to prevent withdrawal or instructions on how to taper the dose to prevent withdrawal    Is the patient prescribed a combination of a benzodiazepine and opioid?  No    Last Urine Drug Screen / ordered today: Yes  No results found for this or any previous visit (from the past 8760 hour(s)).  N/A    Controlled Substance Agreement:  Date of the Last Agreement: 01/24/24   Reviewed Controlled Substance Agreement including but not limited to the benefits, risks, and alternatives to treatment with a Controlled Substance medication(s).    Benzodiazepines:  What is the patient's goal of therapy? To manage symptoms of insomnia  Is this being achieved with current treatment? yes    LEDA-7:  No data recorded    Activities of Daily Living:   Is your overall impression that this patient is benefiting (symptom reduction outweighs side effects) from benzodiazepine therapy? Yes     1. Physical Functioning: Better  2. Family Relationship: Better  3. Social Relationship: Better  4. Mood: Better  5. Sleep Patterns: Better  6. Overall Function: Better     ESS: 5     Review of Systems  Review of systems negative except as per HPI  Objective   /66   Pulse 63   Ht 1.549 m (5' 1\")   Wt 113 kg (250 lb)   SpO2 95%   BMI 47.24 kg/m²    Physical Exam  PHYSICAL EXAM: GENERAL: alert pleasant and cooperative no acute distress  PSYCH EXAM: alert,oriented, in NAD with a full range of affect, normal behavior and no psychotic features    Lab Results   Component Value Date    IRON 39 09/01/2021    TIBC 219 (L) 09/01/2021    " FERRITIN 254 (H) 05/05/2018        Assessment/Plan   Problem List Items Addressed This Visit             ICD-10-CM    Insomnia G47.00     Reyna reviewed the controlled subs agreement with myself and we determined that this is a temporary medication for her.  Will continue to work on improving her sleep hygiene and may refer her for CBT-I.  I did decide to take over this medication to prevent Reyna from going through benzodiazepine withdrawal which can be very severe and she has been without medication for a day.  I did discuss that this medication is not a preferred for management of her insomnia and we will gradually work to taper this medication.  I did try to reach out to her PCP who was previously prescribing this medication however they are out of the office today.  I do intend to discuss further with PCP regarding management of this medication and to try to get indication as to why it was abruptly discontinued         Relevant Medications    clonazePAM (KlonoPIN) 1 mg tablet    Depression with anxiety F41.8     Patient has history of depression anxiety which can contribute to difficulty sleeping.  May recommend behavioral health if this continues to be an issue once patient does tapered off of clonazepam         Medication management - Primary Z79.899    Relevant Orders    Drug Screen, Urine With Reflex to Confirmation

## 2024-01-24 NOTE — ASSESSMENT & PLAN NOTE
Patient has history of depression anxiety which can contribute to difficulty sleeping.  May recommend behavioral health if this continues to be an issue once patient does tapered off of clonazepam

## 2024-01-24 NOTE — ASSESSMENT & PLAN NOTE
Reyna reviewed the controlled subs agreement with myself and we determined that this is a temporary medication for her.  Will continue to work on improving her sleep hygiene and may refer her for CBT-I.  I did decide to take over this medication to prevent Reyna from going through benzodiazepine withdrawal which can be very severe and she has been without medication for a day.  I did discuss that this medication is not a preferred for management of her insomnia and we will gradually work to taper this medication.  I did try to reach out to her PCP who was previously prescribing this medication however they are out of the office today.  I do intend to discuss further with PCP regarding management of this medication and to try to get indication as to why it was abruptly discontinued

## 2024-01-29 DIAGNOSIS — F51.04 PSYCHOPHYSIOLOGICAL INSOMNIA: ICD-10-CM

## 2024-01-29 RX ORDER — CLONAZEPAM 1 MG/1
1 TABLET ORAL NIGHTLY
Qty: 30 TABLET | Refills: 2 | Status: SHIPPED | OUTPATIENT
Start: 2024-01-29 | End: 2024-02-22 | Stop reason: SDUPTHER

## 2024-01-29 NOTE — TELEPHONE ENCOUNTER
I have called Dr. Macias [facility] office a couple times without any answer.  I left a message with him on his office phone and sent a message via epic message system..

## 2024-02-16 ENCOUNTER — TELEPHONE (OUTPATIENT)
Dept: PRIMARY CARE | Facility: CLINIC | Age: 69
End: 2024-02-16
Payer: MEDICARE

## 2024-02-16 DIAGNOSIS — Z12.31 ENCOUNTER FOR SCREENING MAMMOGRAM FOR BREAST CANCER: ICD-10-CM

## 2024-02-20 ENCOUNTER — HOSPITAL ENCOUNTER (OUTPATIENT)
Dept: RADIOLOGY | Facility: CLINIC | Age: 69
Discharge: HOME | End: 2024-02-20
Payer: MEDICARE

## 2024-02-20 VITALS — WEIGHT: 250 LBS | HEIGHT: 64 IN | BODY MASS INDEX: 42.68 KG/M2

## 2024-02-20 DIAGNOSIS — Z12.31 ENCOUNTER FOR SCREENING MAMMOGRAM FOR BREAST CANCER: ICD-10-CM

## 2024-02-20 PROCEDURE — 77067 SCR MAMMO BI INCL CAD: CPT

## 2024-02-22 ENCOUNTER — OFFICE VISIT (OUTPATIENT)
Dept: SLEEP MEDICINE | Facility: CLINIC | Age: 69
End: 2024-02-22
Payer: MEDICARE

## 2024-02-22 VITALS
SYSTOLIC BLOOD PRESSURE: 110 MMHG | BODY MASS INDEX: 42.68 KG/M2 | HEART RATE: 55 BPM | WEIGHT: 250 LBS | HEIGHT: 64 IN | DIASTOLIC BLOOD PRESSURE: 66 MMHG | OXYGEN SATURATION: 96 %

## 2024-02-22 DIAGNOSIS — F51.04 PSYCHOPHYSIOLOGICAL INSOMNIA: ICD-10-CM

## 2024-02-22 DIAGNOSIS — G47.33 OBSTRUCTIVE SLEEP APNEA (ADULT) (PEDIATRIC): Primary | ICD-10-CM

## 2024-02-22 DIAGNOSIS — G47.21 CIRCADIAN RHYTHM SLEEP DISORDER, DELAYED SLEEP PHASE TYPE: ICD-10-CM

## 2024-02-22 PROCEDURE — 99214 OFFICE O/P EST MOD 30 MIN: CPT | Performed by: INTERNAL MEDICINE

## 2024-02-22 PROCEDURE — 1159F MED LIST DOCD IN RCRD: CPT | Performed by: INTERNAL MEDICINE

## 2024-02-22 PROCEDURE — 3078F DIAST BP <80 MM HG: CPT | Performed by: INTERNAL MEDICINE

## 2024-02-22 PROCEDURE — 1036F TOBACCO NON-USER: CPT | Performed by: INTERNAL MEDICINE

## 2024-02-22 PROCEDURE — 1126F AMNT PAIN NOTED NONE PRSNT: CPT | Performed by: INTERNAL MEDICINE

## 2024-02-22 PROCEDURE — 3074F SYST BP LT 130 MM HG: CPT | Performed by: INTERNAL MEDICINE

## 2024-02-22 PROCEDURE — 1160F RVW MEDS BY RX/DR IN RCRD: CPT | Performed by: INTERNAL MEDICINE

## 2024-02-22 RX ORDER — CLONAZEPAM 1 MG/1
0.5 TABLET ORAL NIGHTLY
Qty: 30 TABLET | Refills: 2
Start: 2024-02-22 | End: 2024-03-04 | Stop reason: SDUPTHER

## 2024-02-22 ASSESSMENT — SLEEP AND FATIGUE QUESTIONNAIRES
HOW LIKELY ARE YOU TO NOD OFF OR FALL ASLEEP IN A CAR, WHILE STOPPED FOR A FEW MINUTES IN TRAFFIC: WOULD NEVER DOZE
ESS-CHAD TOTAL SCORE: 5
HOW LIKELY ARE YOU TO NOD OFF OR FALL ASLEEP WHILE SITTING QUIETLY AFTER LUNCH WITHOUT ALCOHOL: WOULD NEVER DOZE
HOW LIKELY ARE YOU TO NOD OFF OR FALL ASLEEP WHILE LYING DOWN TO REST IN THE AFTERNOON WHEN CIRCUMSTANCES PERMIT: HIGH CHANCE OF DOZING
HOW LIKELY ARE YOU TO NOD OFF OR FALL ASLEEP WHILE WATCHING TV: WOULD NEVER DOZE
SITING INACTIVE IN A PUBLIC PLACE LIKE A CLASS ROOM OR A MOVIE THEATER: WOULD NEVER DOZE
HOW LIKELY ARE YOU TO NOD OFF OR FALL ASLEEP WHILE SITTING AND TALKING TO SOMEONE: WOULD NEVER DOZE
HOW LIKELY ARE YOU TO NOD OFF OR FALL ASLEEP WHEN YOU ARE A PASSENGER IN A CAR FOR AN HOUR WITHOUT A BREAK: MODERATE CHANCE OF DOZING
HOW LIKELY ARE YOU TO NOD OFF OR FALL ASLEEP WHILE SITTING AND READING: WOULD NEVER DOZE

## 2024-02-22 ASSESSMENT — PAIN SCALES - GENERAL: PAINLEVEL: 0-NO PAIN

## 2024-02-22 NOTE — ASSESSMENT & PLAN NOTE
This has improved with sleeping treatment therapy with bedtime of 11 PM and wake time of 7 AM typically.  Patient does have some daytime sleepiness and will nap

## 2024-02-22 NOTE — ASSESSMENT & PLAN NOTE
She has successfully weaned herself off of melatonin and is overall doing well has had some disrupted sleep but able to get back to sleep.  Her sleep onset latency is relatively normal currently.  We have decided to reduce her clonazepam to 0.5 mg nightly for the next 2 months.  We will then decide to make another dose adjustment with gradual taper

## 2024-02-22 NOTE — PROGRESS NOTES
Subjective   Patient ID: Reyna Escobedo is a 68 y.o. female who presents for Sleep Apnea and Pap Adherence Followup.  HPI    Prior Sleep History:  9/6/2013: Split-night sleep study at [facility]: AHI 92, CPAP 11 cm H2O optimal.  Recommended AutoPap 9 to 15 cm H2O   12/16/2019: Office visit: Dr. Dante Bush: Reports difficulty with initiating and maintaining sleep for 1 year prior.  Was taken off Ambien 1 year ago was not working great as a sleep aid at that time was more difficulty sleeping.  She was placed on lorazepam 1 mg daily nightly which has not helped her either.  Reported history of patient being on SSRI.  He recommended titrating 9 to 15 cm H2O.  Reported consider repeat titration.  Suggested considering Remeron.  Recommended aggressive sleep hygiene counseling.  Referral to insomnia counselor.  Reported sleep correction was discussed and recommended  11/6/2023: Office Visit: Dr. Anastacio Norton:  Awake around 4 AM.  Twice a week she is to get up around 7 AM.  Find herself dozing off around 9 AM.  Takes melatonin at 9:30 PM.  Sleep scheduleUsual Bedtime 11 PM. Falls asleep around 11:30 PM. Wake time 7 to 10 AM. Total sleep time average is variable hours/day. Naps  Yes .Refreshing naps:   Yes   11/20/2023: Office Visit: Dr. Anastacio Norton: Presents today to review sleep diaries.  Suspicion is that she has a delayed circadian rhythm.  Discussed reducing melatonin and slowly weaning off Ambien as sleep patterns regulate.  Venting sleep schedule due to other obligations.  Review of sleep diary indicates a tendency towards delayed sleep.  Has single nocturnal awakening frequently.  She has been getting out of bed.  She also does take naps most frequently once a day for up to 2 hours   12/11/2023: Office Visit: Dr. Anastacio Norton: Review of sleep diary indicates patient has overall improved her sleep schedule.  With wake time around 730 to 8 AM.  She is occasionally taking naps in the evening or the early  afternoon on most days.  She continues to have nocturnal awakenings with difficulty getting back to sleep on most nights.  Caffeine use has decreased she does exercise in the afternoon occasionally.  She has been exercising which does help consolidate sleep.  Her bedtimes have been more consistent and she is getting somewhere between 7 to 8 hours of sleep at night and taking a 30-minute nap  She is off venlafaxine and on prozac now. . She did not do well with caffeine it kept her awake. She is taking the melatonin, clonazepam, biotin typically takes it around 9:30 PM.    1/11/2024: Office Visit Dr. Anastacio Norton: Here to review her sleep diary since her last visit.  Sleep diaries.  From 12/11/2023 to 1/11/2024.  Sleep diaries indicate average of 7 hours of sleep.  Napping behavior.  Nocturnal awakenings occasionally that appear to be shorter in duration than previously.  Continued dependence on medication for sleep. A downloaded compliance report was reviewed and was interpreted by myself as follows:  > 4 hour compliance was 100 %, with an average use of 10 hours and 9 minutes, with a residual AHI 1.0 on AutoPAP 9-15 cmH2O .  1/24/2024: Office Visit: Dr. Anastacio Norton: Per patient she called stating her PCP would longer prescribe her clonazepam and recommended that her sleep physician fill the prescription without notification from her PCP. She was only provided 5 tabs of clonazepam. She is here to discuss the clonazepam prescription which she is using to manage her insomnia. This was originally prescribed by a therapist and her PCP had taken over the prescription when the patient stopped seeing the therapist. She has also been prescribed zolpidem which she takes PRN. We did not discuss the zolpidem at today's visit, other then me recommending that she does not take this with the clonazepam due to risk over unintentional overdose and death.     Current Sleep History:  Here to follow-up on management of her  "insomnia which is currently being treated with clonazepam.  This was started by another provider and we have discussed plans to taper this medication off.    A downloaded compliance report was reviewed and was interpreted by myself as follows:  > 4 hour compliance was 100%, with an average use of 10 hours and 38 minutes, with a residual AHI 0.9.     Reyna Escobedo reports good benefit from her device.     ESS: 5     Review of Systems  Review of systems negative except as per HPI  Objective   /66   Pulse 55   Ht 1.626 m (5' 4\")   Wt 113 kg (250 lb)   SpO2 96%   BMI 42.91 kg/m²    PREVIOUS WEIGHTS:  Wt Readings from Last 3 Encounters:   02/22/24 113 kg (250 lb)   02/20/24 113 kg (250 lb)   01/24/24 113 kg (250 lb)     Physical Exam  PHYSICAL EXAM: GENERAL: alert pleasant and cooperative no acute distress  PSYCH EXAM: alert,oriented, in NAD with a full range of affect, normal behavior and no psychotic features    Lab Results   Component Value Date    IRON 39 09/01/2021    TIBC 219 (L) 09/01/2021    FERRITIN 254 (H) 05/05/2018        Assessment/Plan   Problem List Items Addressed This Visit             ICD-10-CM    Obstructive sleep apnea (adult) (pediatric) - Primary G47.33     Good compliance and control of MYRIAM with CPAP.  Reports continued benefit with PAP therapy         Insomnia G47.00     She has successfully weaned herself off of melatonin and is overall doing well has had some disrupted sleep but able to get back to sleep.  Her sleep onset latency is relatively normal currently.  We have decided to reduce her clonazepam to 0.5 mg nightly for the next 2 months.  We will then decide to make another dose adjustment with gradual taper         Relevant Medications    clonazePAM (KlonoPIN) 1 mg tablet    Circadian rhythm sleep disorder, delayed sleep phase type G47.21     This has improved with sleeping treatment therapy with bedtime of 11 PM and wake time of 7 AM typically.  Patient does have some " daytime sleepiness and will nap

## 2024-03-04 DIAGNOSIS — F51.04 PSYCHOPHYSIOLOGICAL INSOMNIA: ICD-10-CM

## 2024-03-04 NOTE — TELEPHONE ENCOUNTER
Patient called to let Dr. Norton know [facility] pharmacy never received the prescription refill for Clonazepam 0.5 mg tablet nightly. Patient requesting that it be sent to [facility] pharmacy.    Confirmed with [facility] pharmacy that they never received th e-prescription on 2/22/2024.    Message sent to Dr. Norton.

## 2024-03-06 RX ORDER — CLONAZEPAM 1 MG/1
0.5 TABLET ORAL NIGHTLY
Qty: 30 TABLET | Refills: 2 | Status: SHIPPED | OUTPATIENT
Start: 2024-03-06 | End: 2024-09-02

## 2024-04-25 ENCOUNTER — OFFICE VISIT (OUTPATIENT)
Dept: SLEEP MEDICINE | Facility: CLINIC | Age: 69
End: 2024-04-25
Payer: MEDICARE

## 2024-04-25 ENCOUNTER — LAB (OUTPATIENT)
Dept: LAB | Facility: LAB | Age: 69
End: 2024-04-25
Payer: MEDICARE

## 2024-04-25 VITALS
WEIGHT: 262 LBS | DIASTOLIC BLOOD PRESSURE: 72 MMHG | SYSTOLIC BLOOD PRESSURE: 122 MMHG | BODY MASS INDEX: 44.97 KG/M2 | OXYGEN SATURATION: 96 %

## 2024-04-25 DIAGNOSIS — F51.04 PSYCHOPHYSIOLOGICAL INSOMNIA: ICD-10-CM

## 2024-04-25 DIAGNOSIS — G25.81 RESTLESS LEG SYNDROME: ICD-10-CM

## 2024-04-25 DIAGNOSIS — R19.8 TEETH CLENCHING: ICD-10-CM

## 2024-04-25 DIAGNOSIS — E83.10 DISORDER OF IRON METABOLISM: Primary | ICD-10-CM

## 2024-04-25 DIAGNOSIS — G47.21 CIRCADIAN RHYTHM SLEEP DISORDER, DELAYED SLEEP PHASE TYPE: ICD-10-CM

## 2024-04-25 DIAGNOSIS — G47.33 OBSTRUCTIVE SLEEP APNEA (ADULT) (PEDIATRIC): ICD-10-CM

## 2024-04-25 DIAGNOSIS — E83.10 DISORDER OF IRON METABOLISM: ICD-10-CM

## 2024-04-25 LAB
FERRITIN SERPL-MCNC: 79 NG/ML (ref 8–150)
IRON SATN MFR SERPL: 24 % (ref 25–45)
IRON SERPL-MCNC: 85 UG/DL (ref 35–150)
TIBC SERPL-MCNC: 352 UG/DL (ref 240–445)
UIBC SERPL-MCNC: 267 UG/DL (ref 110–370)

## 2024-04-25 PROCEDURE — 3074F SYST BP LT 130 MM HG: CPT | Performed by: INTERNAL MEDICINE

## 2024-04-25 PROCEDURE — 3078F DIAST BP <80 MM HG: CPT | Performed by: INTERNAL MEDICINE

## 2024-04-25 PROCEDURE — 36415 COLL VENOUS BLD VENIPUNCTURE: CPT

## 2024-04-25 PROCEDURE — 1160F RVW MEDS BY RX/DR IN RCRD: CPT | Performed by: INTERNAL MEDICINE

## 2024-04-25 PROCEDURE — 1159F MED LIST DOCD IN RCRD: CPT | Performed by: INTERNAL MEDICINE

## 2024-04-25 PROCEDURE — 99214 OFFICE O/P EST MOD 30 MIN: CPT | Performed by: INTERNAL MEDICINE

## 2024-04-25 PROCEDURE — 83550 IRON BINDING TEST: CPT

## 2024-04-25 PROCEDURE — 82728 ASSAY OF FERRITIN: CPT

## 2024-04-25 PROCEDURE — 83540 ASSAY OF IRON: CPT

## 2024-04-25 PROCEDURE — 1036F TOBACCO NON-USER: CPT | Performed by: INTERNAL MEDICINE

## 2024-04-25 NOTE — PROGRESS NOTES
Patient: Reyna Escobedo    [id number]  : 1955 -- AGE 69 y.o.    Provider: Anastacio Norton MD     Location [facility]   Service Date: 2024              [facility]  Followup Visit Note      HISTORY OF PRESENT ILLNESS     The patient's referring provider is: No ref. provider found    HISTORY OF PRESENT ILLNESS   Reyna Escobedo is a 69 y.o. female who presents to a [facility] for followup.     The patient  has a past medical history of GERD (gastroesophageal reflux disease) (Spring 2023), Insomnia (Years), and Sleep apnea, obstructive (2006)..    PAST SLEEP HISTORY  2013: Split-night sleep study at [facility]: AHI 92, CPAP 11 cm H2O optimal.  Recommended AutoPap 9 to 15 cm H2O   2019: Office visit: Dr. Dante Bush: Reports difficulty with initiating and maintaining sleep for 1 year prior.  Was taken off Ambien 1 year ago was not working great as a sleep aid at that time was more difficulty sleeping.  She was placed on lorazepam 1 mg daily nightly which has not helped her either.  Reported history of patient being on SSRI.  He recommended titrating 9 to 15 cm H2O.  Reported consider repeat titration.  Suggested considering Remeron.  Recommended aggressive sleep hygiene counseling.  Referral to insomnia counselor.  Reported sleep correction was discussed and recommended  2023: Office Visit: Dr. Anastacio Norton:  Awake around 4 AM.  Twice a week she is to get up around 7 AM.  Find herself dozing off around 9 AM.  Takes melatonin at 9:30 PM.  Sleep scheduleUsual Bedtime 11 PM. Falls asleep around 11:30 PM. Wake time 7 to 10 AM. Total sleep time average is variable hours/day. Naps  Yes .Refreshing naps:   Yes   2023: Office Visit: Dr. Anastacio Norton: Presents today to review sleep diaries.  Suspicion is that she has a delayed circadian rhythm.  Discussed reducing melatonin and slowly weaning  off Ambien as sleep patterns regulate.  Venting sleep schedule due to other obligations.  Review of sleep diary indicates a tendency towards delayed sleep.  Has single nocturnal awakening frequently.  She has been getting out of bed.  She also does take naps most frequently once a day for up to 2 hours   12/11/2023: Office Visit: Dr. Anastacio Norton: Review of sleep diary indicates patient has overall improved her sleep schedule.  With wake time around 730 to 8 AM.  She is occasionally taking naps in the evening or the early afternoon on most days.  She continues to have nocturnal awakenings with difficulty getting back to sleep on most nights.  Caffeine use has decreased she does exercise in the afternoon occasionally.  She has been exercising which does help consolidate sleep.  Her bedtimes have been more consistent and she is getting somewhere between 7 to 8 hours of sleep at night and taking a 30-minute nap  She is off venlafaxine and on prozac now. . She did not do well with caffeine it kept her awake. She is taking the melatonin, clonazepam, biotin typically takes it around 9:30 PM.    1/11/2024: Office Visit Dr. Anastacio Norton: Here to review her sleep diary since her last visit.  Sleep diaries.  From 12/11/2023 to 1/11/2024.  Sleep diaries indicate average of 7 hours of sleep.  Napping behavior.  Nocturnal awakenings occasionally that appear to be shorter in duration than previously.  Continued dependence on medication for sleep. A downloaded compliance report was reviewed and was interpreted by myself as follows:  > 4 hour compliance was 100 %, with an average use of 10 hours and 9 minutes, with a residual AHI 1.0 on AutoPAP 9-15 cmH2O .  1/24/2024: Office Visit: Dr. Anastacio Norton: Per patient she called stating her PCP would longer prescribe her clonazepam and recommended that her sleep physician fill the prescription without notification from her PCP. She was only provided 5 tabs of clonazepam. She is  here to discuss the clonazepam prescription which she is using to manage her insomnia. This was originally prescribed by a therapist and her PCP had taken over the prescription when the patient stopped seeing the therapist. She has also been prescribed zolpidem which she takes PRN. We did not discuss the zolpidem at today's visit, other then me recommending that she does not take this with the clonazepam due to risk over unintentional overdose and death  2/22/2024: Office Visit: Dr. Anastacio Norton: Insomnia has improved with behavioral therapy.  We have reduced her clonazepam to 0.5 mg for the next 2 months.  Circadian rhythm disorder, delayed sleep phase type.  Has improved from previously with typical bedtime of 11 PM and wake time 7 AM    CURRENT HISTORY  She reports that she is having increased difficulty falling asleep and is waking up every 2 hours if she falls asleep. She is clock watching somewhat in the middle of the night but is trying to decrease this      Sleep Schedule: She goes to bed at 11 PM and her sleep latency is 60 minutes. She wakes by  7-8 AM. She has 3 awakenings per night that last for 30 minutes. She gets about 6 hours of sleep per night.    Brain going in overdrive and affects her ability to fall asleep and she has some OCD. She was switched from venlafaxine to fluoxetine. She feels a need to control things.    She is having involuntary leg movements, in the evening, once a week, it occurs when she wakes up in the middle of the night from her sleep. She reports some relief with movement of her legs, it does seem to improve as the night progresses. Not aware of kicking in her sleep    She is taking her clonazepam around 9:30 PM.  She has take eyedrops and before bed which takes about 15 minutes.  She does report ruminating in bed about her sleep    She reports she has been clenching her teeth and now has to wear a mouthguard which is bothersome.  She reports occasionally taking a nap for up  to 1 hour    Naps:   Yes. Naps are refreshing and they last for an hour    She does do water therapy as a form of exercise    ESS: 4      REVIEW OF SYSTEMS     REVIEW OF SYSTEMS  Review of Systems      ALLERGIES AND MEDICATIONS     ALLERGIES  Allergies   Allergen Reactions    Penicillins Hives and Rash    Adhesive Rash       MEDICATIONS: She has a current medication list which includes the following prescription(s): biotin - Take 1 tablet (10 mg) by mouth once daily, clonazepam - Take 0.5 tablets (0.5 mg) by mouth once daily at bedtime, fluoxetine - Take 1 capsule (20 mg) by mouth once daily in the morning, multivit-min/ferrous fumarate - 0 Refill(s), Type: Maintenance, omeprazole - Take 1 capsule (40 mg) by mouth once daily in the morning. Take before meals, and zolpidem cr - Take by mouth.    PAST MEDICAL HISTORY : She  has a past medical history of GERD (gastroesophageal reflux disease) (Spring 2023), Insomnia (Years), and Sleep apnea, obstructive (March 2006).    PAST SURGICAL HISTORY: She  has no past surgical history on file.     FAMILY HISTORY: No changes since previous visit. Otherwise non-contributory as charted.       SOCIAL HISTORY  She  reports that she quit smoking about 33 years ago. Her smoking use included cigarettes. She started smoking about 51 years ago. She has a 18 pack-year smoking history. She has never used smokeless tobacco. She reports current alcohol use of about 10.0 standard drinks of alcohol per week. She reports that she does not use drugs.       PHYSICAL EXAM     VITAL SIGNS: /72   Wt 119 kg (262 lb)   SpO2 96%   BMI 44.97 kg/m²      CURRENT WEIGHT: [unfilled]  BMI: [unfilled]  PREVIOUS WEIGHTS:  Wt Readings from Last 3 Encounters:   04/25/24 119 kg (262 lb)   02/22/24 113 kg (250 lb)   02/20/24 113 kg (250 lb)       Physical Exam  PHYSICAL EXAM: GENERAL: alert pleasant and cooperative no acute distress  PSYCH EXAM: alert,oriented, in NAD with a full range of affect, normal  behavior and no psychotic features      RESULTS/DATA     Iron (UG/DL)   Date Value   09/01/2021 39   05/05/2018 74     Iron Saturation (%)   Date Value   09/01/2021 17.8   05/05/2018 25.5     TIBC (UG/DL)   Date Value   09/01/2021 219 (L)   05/05/2018 290     Ferritin (NG/ML)   Date Value   05/05/2018 254 (H)       PAP Adherence  A PAP adherence download was obtained and data was reviewed personally today in clinic. (see scanned document in EPIC)  COMPLIANCE REPORT RESULTS: Date Range:  3/26/2024 - 4/24/2024 Days used: 30 > 4 hours usage:  97 % Average usage hours on dates used: 9.75 hours Residual AHI 1.0 on AutoPap 9 to 15 cm H2O      ASSESSMENT/PLAN     Ms. Escobedo is a 69 y.o. female and  has a past medical history of GERD (gastroesophageal reflux disease) (Spring 2023), Insomnia (Years), and Sleep apnea, obstructive (March 2006). She returns in followup to the [facility] for their insomnia.    Problem List and Orders  Problem List Items Addressed This Visit             ICD-10-CM    Obstructive sleep apnea (adult) (pediatric) G47.33     Continues to demonstrate excellent control and compliance.  Although her usage of CPAP demonstrates that she is wearing it for 9 hours and 45 minutes.  Her perceived amount of time sleeping is about 6 to 7 hours.  We discussed sleep efficiency concept again         Insomnia G47.00     Struggling since reduced clonazepam dose of 0.5 mg. But not following stimulus control recommendations. Reinforced the need to be out of the bed if not asleep after 30 minutes. She also reports waking up during the night more and having some RLS symptoms         Circadian rhythm sleep disorder, delayed sleep phase type G47.21    Teeth clenching R19.8     Can be tension, but also is known to occur in SSRIs, recently switched from venlafaxine to fluoxetine, may be from fluoxetine. Also dreaming more         Restless leg syndrome G25.81     She is currently noticed that  this has become more of an issue since she reduce her clonazepam.  She also switched to fluoxetine and was previously on venlafaxine both of which can exacerbate RLS symptoms.  Recommend we check iron levels          Other Visit Diagnoses         Codes    Disorder of iron metabolism    -  Primary E83.10    Relevant Orders    Ferritin    Iron and TIBC

## 2024-04-25 NOTE — ASSESSMENT & PLAN NOTE
She is currently noticed that this has become more of an issue since she reduce her clonazepam.  She also switched to fluoxetine and was previously on venlafaxine both of which can exacerbate RLS symptoms.  Recommend we check iron levels

## 2024-04-25 NOTE — ASSESSMENT & PLAN NOTE
Struggling since reduced clonazepam dose of 0.5 mg. But not following stimulus control recommendations. Reinforced the need to be out of the bed if not asleep after 30 minutes. She also reports waking up during the night more and having some RLS symptoms

## 2024-04-25 NOTE — ASSESSMENT & PLAN NOTE
Can be tension, but also is known to occur in SSRIs, recently switched from venlafaxine to fluoxetine, may be from fluoxetine. Also dreaming more

## 2024-04-25 NOTE — PATIENT INSTRUCTIONS
Retraining Your Brain  To associate your bed with sleep  Reasoning: After a prolonged period of sleep difficulties, many people start to associate the bed not with sleep, but with wakefulness, frustration & anxiety. There are techniques to help strengthening the association between bed and sleep, while weakening the association between bed and lying awake/trying to sleep. This is the most thoroughly researched behavioral treatment for insomnia & has decades of research support.  Activities for the middle of the night, so you don't lie awake in bed:  Look through catalogues  Update your address book or start a new one  Sort junk mail & bills (but don't start paying them)  Play sol[employer]urseBuddyire with cards   Catch up on your reading (as long as it's not too stimulating)  Make a grocery list for the coming week  Create a detailed menu for dinners  De-clutter your coffee table, dining room table, kitchen countertops or desk  Create a list of activities that you'd enjoy doing on weekends & vacations  Work on a photo album or scrapbook  Fold & put away clothes  Shop for holiday, wedding or birthday gifts in catalogs  Read magazines or other light material  Make a materials list for a project around the house  Watch [employer], [employer], or the weather channel  Organize collections of CDs or DVDs & choose some to donate or sell   Give yourself a set amount of worry time & jot down anxious thoughts your mind is giving you on a notepad  Knit or do other crafts you can stop once you feel sleepy  Read your kids' books (they are often very comforting & positive)

## 2024-04-25 NOTE — ASSESSMENT & PLAN NOTE
Continues to demonstrate excellent control and compliance.  Although her usage of CPAP demonstrates that she is wearing it for 9 hours and 45 minutes.  Her perceived amount of time sleeping is about 6 to 7 hours.  We discussed sleep efficiency concept again

## 2024-04-29 ENCOUNTER — TELEPHONE (OUTPATIENT)
Dept: SLEEP MEDICINE | Facility: HOSPITAL | Age: 69
End: 2024-04-29
Payer: MEDICARE

## 2024-04-29 NOTE — TELEPHONE ENCOUNTER
Called patient and left VM regarding iron lab results.  Sleep nurse phone number [phone] - given to call back to discuss results and plan going forward.

## 2024-04-29 NOTE — TELEPHONE ENCOUNTER
----- Message from Anastacio Norton MD sent at 4/29/2024  8:28 AM EDT -----  Iron levels low. Needs iron supplementation

## 2024-05-01 ENCOUNTER — TELEPHONE (OUTPATIENT)
Dept: SLEEP MEDICINE | Facility: CLINIC | Age: 69
End: 2024-05-01
Payer: MEDICARE

## 2024-05-01 DIAGNOSIS — G25.81 RESTLESS LEG SYNDROME: Primary | ICD-10-CM

## 2024-05-01 RX ORDER — FERROUS SULFATE 325(65) MG
325 TABLET, DELAYED RELEASE (ENTERIC COATED) ORAL
Qty: 30 TABLET | Refills: 2 | Status: SHIPPED | OUTPATIENT
Start: 2024-05-01 | End: 2024-07-30

## 2024-05-01 NOTE — TELEPHONE ENCOUNTER
----- Message from Lavern Kirkland sent at 5/1/2024  7:52 AM EDT -----  Regarding: FW: Low Iron need supplement  Contact: [phone]  Medication Supplement   ----- Message -----  From: Reyna Escobedo  Sent: 5/1/2024  12:37 AM EDT  To: [id number] Clinical Support Staff  Subject: Low Iron need supplement                         What mg of iron should I be taking.

## 2024-06-13 ENCOUNTER — APPOINTMENT (OUTPATIENT)
Dept: SLEEP MEDICINE | Facility: CLINIC | Age: 69
End: 2024-06-13
Payer: MEDICARE

## 2024-06-13 VITALS
OXYGEN SATURATION: 97 % | HEIGHT: 64 IN | BODY MASS INDEX: 26.46 KG/M2 | SYSTOLIC BLOOD PRESSURE: 126 MMHG | HEART RATE: 70 BPM | DIASTOLIC BLOOD PRESSURE: 70 MMHG | WEIGHT: 155 LBS

## 2024-06-13 DIAGNOSIS — G47.21 CIRCADIAN RHYTHM SLEEP DISORDER, DELAYED SLEEP PHASE TYPE: ICD-10-CM

## 2024-06-13 DIAGNOSIS — F51.04 CHRONIC INSOMNIA: Primary | ICD-10-CM

## 2024-06-13 DIAGNOSIS — G47.33 OBSTRUCTIVE SLEEP APNEA (ADULT) (PEDIATRIC): ICD-10-CM

## 2024-06-13 DIAGNOSIS — G25.81 RESTLESS LEG SYNDROME: ICD-10-CM

## 2024-06-13 PROCEDURE — 1159F MED LIST DOCD IN RCRD: CPT | Performed by: INTERNAL MEDICINE

## 2024-06-13 PROCEDURE — 1036F TOBACCO NON-USER: CPT | Performed by: INTERNAL MEDICINE

## 2024-06-13 PROCEDURE — G2211 COMPLEX E/M VISIT ADD ON: HCPCS | Performed by: INTERNAL MEDICINE

## 2024-06-13 PROCEDURE — 1126F AMNT PAIN NOTED NONE PRSNT: CPT | Performed by: INTERNAL MEDICINE

## 2024-06-13 PROCEDURE — 3078F DIAST BP <80 MM HG: CPT | Performed by: INTERNAL MEDICINE

## 2024-06-13 PROCEDURE — 3074F SYST BP LT 130 MM HG: CPT | Performed by: INTERNAL MEDICINE

## 2024-06-13 PROCEDURE — 1160F RVW MEDS BY RX/DR IN RCRD: CPT | Performed by: INTERNAL MEDICINE

## 2024-06-13 PROCEDURE — 99214 OFFICE O/P EST MOD 30 MIN: CPT | Performed by: INTERNAL MEDICINE

## 2024-06-13 RX ORDER — CLONAZEPAM 0.5 MG/1
0.25 TABLET ORAL NIGHTLY
Start: 2024-06-13 | End: 2024-07-13

## 2024-06-13 RX ORDER — FERROUS SULFATE 325(65) MG
TABLET ORAL
COMMUNITY
Start: 2024-05-23

## 2024-06-13 ASSESSMENT — SLEEP AND FATIGUE QUESTIONNAIRES
HOW LIKELY ARE YOU TO NOD OFF OR FALL ASLEEP WHILE SITTING AND TALKING TO SOMEONE: WOULD NEVER DOZE
HOW LIKELY ARE YOU TO NOD OFF OR FALL ASLEEP WHEN YOU ARE A PASSENGER IN A CAR FOR AN HOUR WITHOUT A BREAK: MODERATE CHANCE OF DOZING
HOW LIKELY ARE YOU TO NOD OFF OR FALL ASLEEP WHILE WATCHING TV: WOULD NEVER DOZE
HOW LIKELY ARE YOU TO NOD OFF OR FALL ASLEEP IN A CAR, WHILE STOPPED FOR A FEW MINUTES IN TRAFFIC: WOULD NEVER DOZE
HOW LIKELY ARE YOU TO NOD OFF OR FALL ASLEEP WHILE SITTING AND READING: WOULD NEVER DOZE
HOW LIKELY ARE YOU TO NOD OFF OR FALL ASLEEP WHILE LYING DOWN TO REST IN THE AFTERNOON WHEN CIRCUMSTANCES PERMIT: HIGH CHANCE OF DOZING
SITING INACTIVE IN A PUBLIC PLACE LIKE A CLASS ROOM OR A MOVIE THEATER: WOULD NEVER DOZE
ESS-CHAD TOTAL SCORE: 5
HOW LIKELY ARE YOU TO NOD OFF OR FALL ASLEEP WHILE SITTING QUIETLY AFTER LUNCH WITHOUT ALCOHOL: WOULD NEVER DOZE

## 2024-06-13 ASSESSMENT — PAIN SCALES - GENERAL: PAINLEVEL: 0-NO PAIN

## 2024-06-13 NOTE — ASSESSMENT & PLAN NOTE
She continues to have variable wake times depending on if she is watching her grandchildren or not

## 2024-06-13 NOTE — PROGRESS NOTES
Patient: Reyna Escobedo    [id number]  : 1955 -- AGE 69 y.o.    Provider: Anastacio Norton MD     Location [facility]   Service Date: 2024              [facility]  Followup Visit Note      HISTORY OF PRESENT ILLNESS     The patient's referring provider is: No ref. provider found    HISTORY OF PRESENT ILLNESS   Reyna Escobedo is a 69 y.o. female who presents to a [facility] for followup.     The patient  has a past medical history of GERD (gastroesophageal reflux disease) (Spring 2023), Insomnia (Years), and Sleep apnea, obstructive (2006)..    PAST SLEEP HISTORY  2013: Split-night sleep study at [facility]: AHI 92, CPAP 11 cm H2O optimal.  Recommended AutoPap 9 to 15 cm H2O   2019: Office visit: Dr. Dante Bush: Reports difficulty with initiating and maintaining sleep for 1 year prior.  Was taken off Ambien 1 year ago was not working great as a sleep aid at that time was more difficulty sleeping.  She was placed on lorazepam 1 mg daily nightly which has not helped her either.  Reported history of patient being on SSRI.  He recommended titrating 9 to 15 cm H2O.  Reported consider repeat titration.  Suggested considering Remeron.  Recommended aggressive sleep hygiene counseling.  Referral to insomnia counselor.  Reported sleep correction was discussed and recommended  2023: Office Visit: Dr. Anastacio Norton:  Awake around 4 AM.  Twice a week she is to get up around 7 AM.  Find herself dozing off around 9 AM.  Takes melatonin at 9:30 PM.  Sleep scheduleUsual Bedtime 11 PM. Falls asleep around 11:30 PM. Wake time 7 to 10 AM. Total sleep time average is variable hours/day. Naps  Yes .Refreshing naps:   Yes   2023: Office Visit: Dr. Anastacio Norton: Presents today to review sleep diaries.  Suspicion is that she has a delayed circadian rhythm.  Discussed reducing melatonin and slowly weaning  off Ambien as sleep patterns regulate.  Venting sleep schedule due to other obligations.  Review of sleep diary indicates a tendency towards delayed sleep.  Has single nocturnal awakening frequently.  She has been getting out of bed.  She also does take naps most frequently once a day for up to 2 hours   12/11/2023: Office Visit: Dr. Anastacio Norton: Review of sleep diary indicates patient has overall improved her sleep schedule.  With wake time around 730 to 8 AM.  She is occasionally taking naps in the evening or the early afternoon on most days.  She continues to have nocturnal awakenings with difficulty getting back to sleep on most nights.  Caffeine use has decreased she does exercise in the afternoon occasionally.  She has been exercising which does help consolidate sleep.  Her bedtimes have been more consistent and she is getting somewhere between 7 to 8 hours of sleep at night and taking a 30-minute nap  She is off venlafaxine and on prozac now. . She did not do well with caffeine it kept her awake. She is taking the melatonin, clonazepam, biotin typically takes it around 9:30 PM.    1/11/2024: Office Visit Dr. Anastacio Norton: Here to review her sleep diary since her last visit.  Sleep diaries.  From 12/11/2023 to 1/11/2024.  Sleep diaries indicate average of 7 hours of sleep.  Napping behavior.  Nocturnal awakenings occasionally that appear to be shorter in duration than previously.  Continued dependence on medication for sleep. A downloaded compliance report was reviewed and was interpreted by myself as follows:  > 4 hour compliance was 100 %, with an average use of 10 hours and 9 minutes, with a residual AHI 1.0 on AutoPAP 9-15 cmH2O .  1/24/2024: Office Visit: Dr. Anastacio Norton: Per patient she called stating her PCP would longer prescribe her clonazepam and recommended that her sleep physician fill the prescription without notification from her PCP. She was only provided 5 tabs of clonazepam. She is  here to discuss the clonazepam prescription which she is using to manage her insomnia. This was originally prescribed by a therapist and her PCP had taken over the prescription when the patient stopped seeing the therapist. She has also been prescribed zolpidem which she takes PRN. We did not discuss the zolpidem at today's visit, other then me recommending that she does not take this with the clonazepam due to risk over unintentional overdose and death  2/22/2024: Office Visit: Dr. Anastacio Norton: Insomnia has improved with behavioral therapy.  We have reduced her clonazepam to 0.5 mg for the next 2 months.  Circadian rhythm disorder, delayed sleep phase type.  Has improved from previously with typical bedtime of 11 PM and wake time 7 AM  4/25/2024: Office Visit: Dr. Anastacio Norton: Difficulty falling asleep waking up every 2 hours.  Clock watching. She goes to bed at 11 PM and her sleep latency is 60 minutes. She wakes by  7-8 AM. She has 3 awakenings per night that last for 30 minutes. She gets about 6 hours of sleep per night. switched from venlafaxine to fluoxetine. She feels a need to control things.  Struggling with sleep since reducing to clonazepam 0.5 mg.  Not following stimulus control recommendations.  Ordered ferritin    CURRENT HISTORY    On today's visit, the patient reports that she is sleeping better and through the night. Caffeine in the morning helps keep her away during the day. She is sleeping through the night.   Taking tylenol PM in the evening 1-2 days a week when caring for her grandson    Started oral iron  Sleep Schedule: She goes to bed at 11 PM and her sleep latency is 30-60 minutes. She wakes by  6:30 - 8 AM. She has 1 awakening per night that last for 50 minutes. She gets about  7-9 hours of sleep per night.    RLS Followup:   [id number]: Current Treatment: oral iron , symptoms well managed      Daytime Symptoms    Naps:   Yes. Naps ARE/ARENOT: are refreshing and shorter in length  "now    ESS: 5       REVIEW OF SYSTEMS     REVIEW OF SYSTEMS  Review of Systems      ALLERGIES AND MEDICATIONS     ALLERGIES  Allergies   Allergen Reactions    Penicillins Hives and Rash    Adhesive Rash       MEDICATIONS: She has a current medication list which includes the following prescription(s): biotin - Take 1 tablet (10 mg) by mouth once daily, ferrous sulfate - Take 1 tablet by mouth once daily with breakfast. Do not crush, chew, or split, ferrous sulfate (325 mg ferrous sulfate) - TAKE 1 TABLET BY MOUTH ONCE DAILY WITH BREAKFAST. DO NOT CRUSH, CHEW, OR SPLIT, fluoxetine - Take 1 capsule (20 mg) by mouth once daily in the morning, multivit-min/ferrous fumarate - 0 Refill(s), Type: Maintenance, omeprazole - Take 1 capsule (40 mg) by mouth once daily in the morning. Take before meals, vit a/vit c/vit e/zinc/copper - Take by mouth, zolpidem cr - Take by mouth, and clonazepam - Take 0.5 tablets (0.25 mg) by mouth once daily at bedtime.    PAST MEDICAL HISTORY : She  has a past medical history of GERD (gastroesophageal reflux disease) (Spring 2023), Insomnia (Years), and Sleep apnea, obstructive (March 2006).    PAST SURGICAL HISTORY: She  has no past surgical history on file.     FAMILY HISTORY: No changes since previous visit. Otherwise non-contributory as charted.       SOCIAL HISTORY  She  reports that she quit smoking about 33 years ago. Her smoking use included cigarettes. She started smoking about 51 years ago. She has a 18 pack-year smoking history. She has never used smokeless tobacco. She reports current alcohol use of about 10.0 standard drinks of alcohol per week. She reports that she does not use drugs.       PHYSICAL EXAM     VITAL SIGNS: /70   Pulse 70   Ht 1.626 m (5' 4\")   Wt 70.3 kg (155 lb)   SpO2 97%   BMI 26.61 kg/m²      CURRENT WEIGHT: [unfilled]  BMI: [unfilled]  PREVIOUS WEIGHTS:  Wt Readings from Last 3 Encounters:   06/13/24 70.3 kg (155 lb)   04/25/24 119 kg (262 lb)   02/22/24 " 113 kg (250 lb)       Physical Exam  PHYSICAL EXAM: GENERAL: alert pleasant and cooperative no acute distress  PSYCH EXAM: alert,oriented, in NAD with a full range of affect, normal behavior and no psychotic features      RESULTS/DATA     Iron   Date Value   04/25/2024 85 ug/dL   09/01/2021 39 UG/DL   05/05/2018 74 UG/DL     % Saturation (%)   Date Value   04/25/2024 24 (L)     Iron Saturation (%)   Date Value   09/01/2021 17.8   05/05/2018 25.5     TIBC   Date Value   04/25/2024 352 ug/dL   09/01/2021 219 UG/DL (L)   05/05/2018 290 UG/DL     Ferritin   Date Value   04/25/2024 79 ng/mL   05/05/2018 254 NG/ML (H)       PAP Adherence  A PAP adherence download was obtained and data was reviewed personally today in clinic. (see scanned document in EPIC)  COMPLIANCE REPORT RESULTS: Days used: 30 > 4 hours usage:  100 % Average usage hours on dates used: 9 hours Residual AHI 1.2      ASSESSMENT/PLAN     Ms. Escobedo is a 69 y.o. female and  has a past medical history of GERD (gastroesophageal reflux disease) (Spring 2023), Insomnia (Years), and Sleep apnea, obstructive (March 2006). She returns in followup to the [facility] for their insomnia, RLS and sleep apnea.    Problem List and Orders  Problem List Items Addressed This Visit             ICD-10-CM    Obstructive sleep apnea (adult) (pediatric) G47.33    Chronic insomnia - Primary F51.04     Mis was from MYRIAM, RLS, poor sleep hygiene. These are improving. We have decided that she will wean her clonazepam down to 0.25 mg at bedtime at her next refill. She is currently taking clonazepam 0.5 mg.  She reports that she has not taken Ambien since her last visit  She has taken tylenol PM to help her sleep. I advised against this due to risk of falls and daytime sedation  -Reyna is in agreement and verbalized understanding  - Will follow-up in 3 months         Relevant Medications    clonazePAM (KlonoPIN) 0.5 mg tablet    Circadian rhythm  sleep disorder, delayed sleep phase type G47.21     She continues to have variable wake times depending on if she is watching her grandchildren or not         Restless leg syndrome G25.81     Responding to oral iron. Will check ferritin and iron panel at the next visit

## 2024-06-13 NOTE — LETTER
2024     Terrance Garcia MD  [address] [place] [facility]  [address]  [place] [address]    Patient: Reyna Escobedo   YOB: 1955   Date of Visit: 2024       Dear Dr. Terrance Garcia MD:    Thank you for referring Reyna Escobedo to me for evaluation. Below are my notes for this consultation.  If you have questions, please do not hesitate to call me. I look forward to following your patient along with you.       Sincerely,     Anastacio Norton MD      CC: No Recipients  ______________________________________________________________________________________         Patient: Reyna Escobedo    [id number]  : 1955 -- AGE 69 y.o.    Provider: Anastacio Norton MD     Location [facility]   Service Date: 2024              [facility]  Followup Visit Note      HISTORY OF PRESENT ILLNESS     The patient's referring provider is: No ref. provider found    HISTORY OF PRESENT ILLNESS   Reyna Escobedo is a 69 y.o. female who presents to a [facility] for followup.     The patient  has a past medical history of GERD (gastroesophageal reflux disease) (Spring 2023), Insomnia (Years), and Sleep apnea, obstructive (2006)..    PAST SLEEP HISTORY  2013: Split-night sleep study at [facility]: AHI 92, CPAP 11 cm H2O optimal.  Recommended AutoPap 9 to 15 cm H2O   2019: Office visit: Dr. Dante Bush: Reports difficulty with initiating and maintaining sleep for 1 year prior.  Was taken off Ambien 1 year ago was not working great as a sleep aid at that time was more difficulty sleeping.  She was placed on lorazepam 1 mg daily nightly which has not helped her either.  Reported history of patient being on SSRI.  He recommended titrating 9 to 15 cm H2O.  Reported consider repeat titration.  Suggested considering Remeron.  Recommended aggressive sleep hygiene counseling.  Referral to insomnia  counselor.  Reported sleep correction was discussed and recommended  11/6/2023: Office Visit: Dr. Anastacio Norton:  Awake around 4 AM.  Twice a week she is to get up around 7 AM.  Find herself dozing off around 9 AM.  Takes melatonin at 9:30 PM.  Sleep scheduleUsual Bedtime 11 PM. Falls asleep around 11:30 PM. Wake time 7 to 10 AM. Total sleep time average is variable hours/day. Naps  Yes .Refreshing naps:   Yes   11/20/2023: Office Visit: Dr. Anastacio Norton: Presents today to review sleep diaries.  Suspicion is that she has a delayed circadian rhythm.  Discussed reducing melatonin and slowly weaning off Ambien as sleep patterns regulate.  Venting sleep schedule due to other obligations.  Review of sleep diary indicates a tendency towards delayed sleep.  Has single nocturnal awakening frequently.  She has been getting out of bed.  She also does take naps most frequently once a day for up to 2 hours   12/11/2023: Office Visit: Dr. Anastacio Norton: Review of sleep diary indicates patient has overall improved her sleep schedule.  With wake time around 730 to 8 AM.  She is occasionally taking naps in the evening or the early afternoon on most days.  She continues to have nocturnal awakenings with difficulty getting back to sleep on most nights.  Caffeine use has decreased she does exercise in the afternoon occasionally.  She has been exercising which does help consolidate sleep.  Her bedtimes have been more consistent and she is getting somewhere between 7 to 8 hours of sleep at night and taking a 30-minute nap  She is off venlafaxine and on prozac now. . She did not do well with caffeine it kept her awake. She is taking the melatonin, clonazepam, biotin typically takes it around 9:30 PM.    1/11/2024: Office Visit Dr. Anastacio Norton: Here to review her sleep diary since her last visit.  Sleep diaries.  From 12/11/2023 to 1/11/2024.  Sleep diaries indicate average of 7 hours of sleep.  Napping behavior.  Nocturnal  awakenings occasionally that appear to be shorter in duration than previously.  Continued dependence on medication for sleep. A downloaded compliance report was reviewed and was interpreted by myself as follows:  > 4 hour compliance was 100 %, with an average use of 10 hours and 9 minutes, with a residual AHI 1.0 on AutoPAP 9-15 cmH2O .  1/24/2024: Office Visit: Dr. Anastacio Norton: Per patient she called stating her PCP would longer prescribe her clonazepam and recommended that her sleep physician fill the prescription without notification from her PCP. She was only provided 5 tabs of clonazepam. She is here to discuss the clonazepam prescription which she is using to manage her insomnia. This was originally prescribed by a therapist and her PCP had taken over the prescription when the patient stopped seeing the therapist. She has also been prescribed zolpidem which she takes PRN. We did not discuss the zolpidem at today's visit, other then me recommending that she does not take this with the clonazepam due to risk over unintentional overdose and death  2/22/2024: Office Visit: Dr. Anastacio Norton: Insomnia has improved with behavioral therapy.  We have reduced her clonazepam to 0.5 mg for the next 2 months.  Circadian rhythm disorder, delayed sleep phase type.  Has improved from previously with typical bedtime of 11 PM and wake time 7 AM  4/25/2024: Office Visit: Dr. Anastacio Norton: Difficulty falling asleep waking up every 2 hours.  Clock watching. She goes to bed at 11 PM and her sleep latency is 60 minutes. She wakes by  7-8 AM. She has 3 awakenings per night that last for 30 minutes. She gets about 6 hours of sleep per night. switched from venlafaxine to fluoxetine. She feels a need to control things.  Struggling with sleep since reducing to clonazepam 0.5 mg.  Not following stimulus control recommendations.  Ordered ferritin    CURRENT HISTORY    On today's visit, the patient reports that she is sleeping  better and through the night. Caffeine in the morning helps keep her away during the day. She is sleeping through the night.   Taking tylenol PM in the evening 1-2 days a week when caring for her grandson    Started oral iron  Sleep Schedule: She goes to bed at 11 PM and her sleep latency is 30-60 minutes. She wakes by  6:30 - 8 AM. She has 1 awakening per night that last for 50 minutes. She gets about  7-9 hours of sleep per night.    RLS Followup:   SPPRLSFUSX: Current Treatment: oral iron , symptoms well managed      Daytime Symptoms    Naps:   Yes. Naps ARE/ARENOT: are refreshing and shorter in length now    ESS: 5       REVIEW OF SYSTEMS     REVIEW OF SYSTEMS  Review of Systems      ALLERGIES AND MEDICATIONS     ALLERGIES  Allergies   Allergen Reactions   • Penicillins Hives and Rash   • Adhesive Rash       MEDICATIONS: She has a current medication list which includes the following prescription(s): biotin - Take 1 tablet (10 mg) by mouth once daily, ferrous sulfate - Take 1 tablet by mouth once daily with breakfast. Do not crush, chew, or split, ferrous sulfate (325 mg ferrous sulfate) - TAKE 1 TABLET BY MOUTH ONCE DAILY WITH BREAKFAST. DO NOT CRUSH, CHEW, OR SPLIT, fluoxetine - Take 1 capsule (20 mg) by mouth once daily in the morning, multivit-min/ferrous fumarate - 0 Refill(s), Type: Maintenance, omeprazole - Take 1 capsule (40 mg) by mouth once daily in the morning. Take before meals, vit a/vit c/vit e/zinc/copper - Take by mouth, zolpidem cr - Take by mouth, and clonazepam - Take 0.5 tablets (0.25 mg) by mouth once daily at bedtime.    PAST MEDICAL HISTORY : She  has a past medical history of GERD (gastroesophageal reflux disease) (Spring 2023), Insomnia (Years), and Sleep apnea, obstructive (March 2006).    PAST SURGICAL HISTORY: She  has no past surgical history on file.     FAMILY HISTORY: No changes since previous visit. Otherwise non-contributory as charted.       SOCIAL HISTORY  She  reports that she  "quit smoking about 33 years ago. Her smoking use included cigarettes. She started smoking about 51 years ago. She has a 18 pack-year smoking history. She has never used smokeless tobacco. She reports current alcohol use of about 10.0 standard drinks of alcohol per week. She reports that she does not use drugs.       PHYSICAL EXAM     VITAL SIGNS: /70   Pulse 70   Ht 1.626 m (5' 4\")   Wt 70.3 kg (155 lb)   SpO2 97%   BMI 26.61 kg/m²      CURRENT WEIGHT: [unfilled]  BMI: [unfilled]  PREVIOUS WEIGHTS:  Wt Readings from Last 3 Encounters:   06/13/24 70.3 kg (155 lb)   04/25/24 119 kg (262 lb)   02/22/24 113 kg (250 lb)       Physical Exam  PHYSICAL EXAM: GENERAL: alert pleasant and cooperative no acute distress  PSYCH EXAM: alert,oriented, in NAD with a full range of affect, normal behavior and no psychotic features      RESULTS/DATA     Iron   Date Value   04/25/2024 85 ug/dL   09/01/2021 39 UG/DL   05/05/2018 74 UG/DL     % Saturation (%)   Date Value   04/25/2024 24 (L)     Iron Saturation (%)   Date Value   09/01/2021 17.8   05/05/2018 25.5     TIBC   Date Value   04/25/2024 352 ug/dL   09/01/2021 219 UG/DL (L)   05/05/2018 290 UG/DL     Ferritin   Date Value   04/25/2024 79 ng/mL   05/05/2018 254 NG/ML (H)       PAP Adherence  A PAP adherence download was obtained and data was reviewed personally today in clinic. (see scanned document in EPIC)  COMPLIANCE REPORT RESULTS: Days used: 30 > 4 hours usage:  100 % Average usage hours on dates used: 9 hours Residual AHI 1.2      ASSESSMENT/PLAN     Ms. Escobedo is a 69 y.o. female and  has a past medical history of GERD (gastroesophageal reflux disease) (Spring 2023), Insomnia (Years), and Sleep apnea, obstructive (March 2006). She returns in followup to the [facility] for their insomnia, RLS and sleep apnea.    Problem List and Orders  Problem List Items Addressed This Visit             ICD-10-CM    Obstructive sleep apnea (adult) " (pediatric) G47.33    Chronic insomnia - Primary F51.04     Mis was from MYRIMA, RLS, poor sleep hygiene. These are improving. We have decided that she will wean her clonazepam down to 0.25 mg at bedtime at her next refill. She is currently taking clonazepam 0.5 mg.  She reports that she has not taken Ambien since her last visit  She has taken tylenol PM to help her sleep. I advised against this due to risk of falls and daytime sedation  -Reyna is in agreement and verbalized understanding  - Will follow-up in 3 months         Relevant Medications    clonazePAM (KlonoPIN) 0.5 mg tablet    Circadian rhythm sleep disorder, delayed sleep phase type G47.21     She continues to have variable wake times depending on if she is watching her grandchildren or not         Restless leg syndrome G25.81     Responding to oral iron. Will check ferritin and iron panel at the next visit

## 2024-06-13 NOTE — ASSESSMENT & PLAN NOTE
Mis was from MYRIAM, RLS, poor sleep hygiene. These are improving. We have decided that she will wean her clonazepam down to 0.25 mg at bedtime at her next refill. She is currently taking clonazepam 0.5 mg.  She reports that she has not taken Ambien since her last visit  She has taken tylenol PM to help her sleep. I advised against this due to risk of falls and daytime sedation  -Reyna is in agreement and verbalized understanding  - Will follow-up in 3 months

## 2024-07-22 ENCOUNTER — PATIENT MESSAGE (OUTPATIENT)
Dept: SLEEP MEDICINE | Facility: CLINIC | Age: 69
End: 2024-07-22
Payer: MEDICARE

## 2024-07-22 DIAGNOSIS — F51.04 CHRONIC INSOMNIA: ICD-10-CM

## 2024-07-22 DIAGNOSIS — G25.81 RESTLESS LEG SYNDROME: ICD-10-CM

## 2024-07-24 ENCOUNTER — TELEPHONE (OUTPATIENT)
Dept: PRIMARY CARE | Facility: CLINIC | Age: 69
End: 2024-07-24
Payer: MEDICARE

## 2024-07-24 DIAGNOSIS — N64.9 DISORDER OF BREAST, UNSPECIFIED: ICD-10-CM

## 2024-07-24 DIAGNOSIS — Z12.39 BREAST CANCER SCREENING, HIGH RISK PATIENT: ICD-10-CM

## 2024-07-24 RX ORDER — CLONAZEPAM 0.5 MG/1
0.25 TABLET ORAL NIGHTLY
Qty: 15 TABLET | Refills: 0 | Status: SHIPPED | OUTPATIENT
Start: 2024-07-24 | End: 2024-08-23

## 2024-07-24 RX ORDER — FERROUS SULFATE 325(65) MG
325 TABLET, DELAYED RELEASE (ENTERIC COATED) ORAL
Qty: 30 TABLET | Refills: 2 | Status: SHIPPED | OUTPATIENT
Start: 2024-07-24 | End: 2024-10-22

## 2024-08-21 ENCOUNTER — HOSPITAL ENCOUNTER (OUTPATIENT)
Dept: RADIOLOGY | Facility: HOSPITAL | Age: 69
Discharge: HOME | End: 2024-08-21
Payer: MEDICARE

## 2024-08-21 DIAGNOSIS — Z12.39 BREAST CANCER SCREENING, HIGH RISK PATIENT: ICD-10-CM

## 2024-08-21 DIAGNOSIS — F51.04 CHRONIC INSOMNIA: ICD-10-CM

## 2024-08-21 DIAGNOSIS — N64.9 DISORDER OF BREAST, UNSPECIFIED: ICD-10-CM

## 2024-08-21 PROCEDURE — A9575 INJ GADOTERATE MEGLUMI 0.1ML: HCPCS | Performed by: FAMILY MEDICINE

## 2024-08-21 PROCEDURE — 2550000001 HC RX 255 CONTRASTS: Performed by: FAMILY MEDICINE

## 2024-08-21 PROCEDURE — 77049 MRI BREAST C-+ W/CAD BI: CPT

## 2024-08-21 RX ORDER — GADOTERATE MEGLUMINE 376.9 MG/ML
20 INJECTION INTRAVENOUS
Status: COMPLETED | OUTPATIENT
Start: 2024-08-21 | End: 2024-08-21

## 2024-08-21 RX ORDER — CLONAZEPAM 0.5 MG/1
0.25 TABLET ORAL NIGHTLY
Qty: 15 TABLET | Refills: 0 | Status: SHIPPED | OUTPATIENT
Start: 2024-08-21 | End: 2024-09-20

## 2024-08-21 NOTE — TELEPHONE ENCOUNTER
Pt requesting clonazepam refill #15 for 30 days with early refill per Dr. Norton.     This RN checked [facility] and it is consist with prescribed medications. Patient has been filling Rx appropriately. Prescription request sent to provider to review..    Next sleep med appt 9/26/2024

## 2024-09-26 ENCOUNTER — APPOINTMENT (OUTPATIENT)
Dept: SLEEP MEDICINE | Facility: CLINIC | Age: 69
End: 2024-09-26
Payer: MEDICARE

## 2024-09-26 VITALS — DIASTOLIC BLOOD PRESSURE: 68 MMHG | HEART RATE: 69 BPM | SYSTOLIC BLOOD PRESSURE: 128 MMHG | OXYGEN SATURATION: 98 %

## 2024-09-26 DIAGNOSIS — F51.04 CHRONIC INSOMNIA: ICD-10-CM

## 2024-09-26 DIAGNOSIS — G47.21 CIRCADIAN RHYTHM SLEEP DISORDER, DELAYED SLEEP PHASE TYPE: ICD-10-CM

## 2024-09-26 DIAGNOSIS — E83.10 DISORDER OF IRON METABOLISM: Primary | ICD-10-CM

## 2024-09-26 DIAGNOSIS — G47.33 OBSTRUCTIVE SLEEP APNEA (ADULT) (PEDIATRIC): ICD-10-CM

## 2024-09-26 DIAGNOSIS — G25.81 RESTLESS LEG SYNDROME: ICD-10-CM

## 2024-09-26 PROCEDURE — 3078F DIAST BP <80 MM HG: CPT | Performed by: INTERNAL MEDICINE

## 2024-09-26 PROCEDURE — 1036F TOBACCO NON-USER: CPT | Performed by: INTERNAL MEDICINE

## 2024-09-26 PROCEDURE — 1159F MED LIST DOCD IN RCRD: CPT | Performed by: INTERNAL MEDICINE

## 2024-09-26 PROCEDURE — 1160F RVW MEDS BY RX/DR IN RCRD: CPT | Performed by: INTERNAL MEDICINE

## 2024-09-26 PROCEDURE — 99214 OFFICE O/P EST MOD 30 MIN: CPT | Performed by: INTERNAL MEDICINE

## 2024-09-26 PROCEDURE — 3074F SYST BP LT 130 MM HG: CPT | Performed by: INTERNAL MEDICINE

## 2024-09-26 PROCEDURE — 1126F AMNT PAIN NOTED NONE PRSNT: CPT | Performed by: INTERNAL MEDICINE

## 2024-09-26 RX ORDER — CLONAZEPAM 0.5 MG/1
0.25 TABLET ORAL NIGHTLY
Qty: 15 TABLET | Refills: 0 | Status: SHIPPED | OUTPATIENT
Start: 2024-09-26 | End: 2024-10-26

## 2024-09-26 ASSESSMENT — SLEEP AND FATIGUE QUESTIONNAIRES
HOW LIKELY ARE YOU TO NOD OFF OR FALL ASLEEP WHILE SITTING AND READING: WOULD NEVER DOZE
ESS-CHAD TOTAL SCORE: 0
HOW LIKELY ARE YOU TO NOD OFF OR FALL ASLEEP WHILE SITTING AND TALKING TO SOMEONE: WOULD NEVER DOZE
HOW LIKELY ARE YOU TO NOD OFF OR FALL ASLEEP IN A CAR, WHILE STOPPED FOR A FEW MINUTES IN TRAFFIC: WOULD NEVER DOZE
HOW LIKELY ARE YOU TO NOD OFF OR FALL ASLEEP WHILE LYING DOWN TO REST IN THE AFTERNOON WHEN CIRCUMSTANCES PERMIT: WOULD NEVER DOZE
SITING INACTIVE IN A PUBLIC PLACE LIKE A CLASS ROOM OR A MOVIE THEATER: WOULD NEVER DOZE
HOW LIKELY ARE YOU TO NOD OFF OR FALL ASLEEP WHEN YOU ARE A PASSENGER IN A CAR FOR AN HOUR WITHOUT A BREAK: WOULD NEVER DOZE
HOW LIKELY ARE YOU TO NOD OFF OR FALL ASLEEP WHILE SITTING QUIETLY AFTER LUNCH WITHOUT ALCOHOL: WOULD NEVER DOZE
HOW LIKELY ARE YOU TO NOD OFF OR FALL ASLEEP WHILE WATCHING TV: WOULD NEVER DOZE

## 2024-09-26 ASSESSMENT — PAIN SCALES - GENERAL: PAINLEVEL: 0-NO PAIN

## 2024-09-26 NOTE — ASSESSMENT & PLAN NOTE
She is having some breakthrough RLS symptoms since she began weaning off of the clonazepam.  She does have low ferritin levels.  We will recheck her iron stores in 3 months.  She is recommend to continue with oral iron therapy.  If she continues to demonstrate RLS symptoms and we will consider iron infusion.  We did discuss iron infusion as a treatment option

## 2024-09-26 NOTE — ASSESSMENT & PLAN NOTE
We have successfully weaned her down to minimum doses of clonazepam.  Next step is to wean her off completely.  She has utilized behavioral therapy techniques which have helped her manage her insomnia without medication.  She is very happy she periodically takes Tylenol PM

## 2024-09-26 NOTE — PROGRESS NOTES
Patient: Reyna Escobedo    [id number]  : 1955 -- AGE 69 y.o.    Provider: Anastacio Norton MD     Location [facility]   Service Date: 2024              [facility]  Followup Visit Note      HISTORY OF PRESENT ILLNESS     The patient's referring provider is: No ref. provider found    HISTORY OF PRESENT ILLNESS   Reyna Escobedo is a 69 y.o. female who presents to a [facility] for followup.     The patient  has a past medical history of GERD (gastroesophageal reflux disease) (Spring 2023), Insomnia (Years), and Sleep apnea, obstructive (2006)..    PAST SLEEP HISTORY  2013: Split-night sleep study at [facility]: AHI 92, CPAP 11 cm H2O optimal.  Recommended AutoPap 9 to 15 cm H2O   2019: Office visit: Dr. Dante Bush: Reports difficulty with initiating and maintaining sleep for 1 year prior.  Was taken off Ambien 1 year ago was not working great as a sleep aid at that time was more difficulty sleeping.  She was placed on lorazepam 1 mg daily nightly which has not helped her either.  Reported history of patient being on SSRI.  He recommended titrating 9 to 15 cm H2O.  Reported consider repeat titration.  Suggested considering Remeron.  Recommended aggressive sleep hygiene counseling.  Referral to insomnia counselor.  Reported sleep correction was discussed and recommended  2023: Office Visit: Dr. Anastacio Norton:  Awake around 4 AM.  Twice a week she is to get up around 7 AM.  Find herself dozing off around 9 AM.  Takes melatonin at 9:30 PM.  Sleep scheduleUsual Bedtime 11 PM. Falls asleep around 11:30 PM. Wake time 7 to 10 AM. Total sleep time average is variable hours/day. Naps  Yes .Refreshing naps:   Yes   2023: Office Visit: Dr. Anastacio Norton: Presents today to review sleep diaries.  Suspicion is that she has a delayed circadian rhythm.  Discussed reducing melatonin and slowly weaning  off Ambien as sleep patterns regulate.  Venting sleep schedule due to other obligations.  Review of sleep diary indicates a tendency towards delayed sleep.  Has single nocturnal awakening frequently.  She has been getting out of bed.  She also does take naps most frequently once a day for up to 2 hours   12/11/2023: Office Visit: Dr. Anastacio Norton: Review of sleep diary indicates patient has overall improved her sleep schedule.  With wake time around 730 to 8 AM.  She is occasionally taking naps in the evening or the early afternoon on most days.  She continues to have nocturnal awakenings with difficulty getting back to sleep on most nights.  Caffeine use has decreased she does exercise in the afternoon occasionally.  She has been exercising which does help consolidate sleep.  Her bedtimes have been more consistent and she is getting somewhere between 7 to 8 hours of sleep at night and taking a 30-minute nap  She is off venlafaxine and on prozac now. . She did not do well with caffeine it kept her awake. She is taking the melatonin, clonazepam, biotin typically takes it around 9:30 PM.    1/11/2024: Office Visit Dr. Anastacio Norton: Here to review her sleep diary since her last visit.  Sleep diaries.  From 12/11/2023 to 1/11/2024.  Sleep diaries indicate average of 7 hours of sleep.  Napping behavior.  Nocturnal awakenings occasionally that appear to be shorter in duration than previously.  Continued dependence on medication for sleep. A downloaded compliance report was reviewed and was interpreted by myself as follows:  > 4 hour compliance was 100 %, with an average use of 10 hours and 9 minutes, with a residual AHI 1.0 on AutoPAP 9-15 cmH2O .  1/24/2024: Office Visit: Dr. Anastacio Norton: Per patient she called stating her PCP would longer prescribe her clonazepam and recommended that her sleep physician fill the prescription without notification from her PCP. She was only provided 5 tabs of clonazepam. She is  here to discuss the clonazepam prescription which she is using to manage her insomnia. This was originally prescribed by a therapist and her PCP had taken over the prescription when the patient stopped seeing the therapist. She has also been prescribed zolpidem which she takes PRN. We did not discuss the zolpidem at today's visit, other then me recommending that she does not take this with the clonazepam due to risk over unintentional overdose and death  2/22/2024: Office Visit: Dr. Anastacio Norton: Insomnia has improved with behavioral therapy.  We have reduced her clonazepam to 0.5 mg for the next 2 months.  Circadian rhythm disorder, delayed sleep phase type.  Has improved from previously with typical bedtime of 11 PM and wake time 7 AM  4/25/2024: Office Visit: Dr. Anastacio Norton: Difficulty falling asleep waking up every 2 hours.  Clock watching. She goes to bed at 11 PM and her sleep latency is 60 minutes. She wakes by  7-8 AM. She has 3 awakenings per night that last for 30 minutes. She gets about 6 hours of sleep per night. switched from venlafaxine to fluoxetine. She feels a need to control things.  Struggling with sleep since reducing to clonazepam 0.5 mg.  Not following stimulus control recommendations.  Ordered ferritin  6/13/2024: Office Visit: Dr. Anastacio Norton: Weaning down on clonazepam to 0.25 mg.  Patient is sleeping well at most recent visit.  Took Tylenol PM occasionally 1 to 2 days a week when caring for her grandson.  Started oral iron for low ferritin levels due to RLS.  Circadian rhythm delayed sleep phase continues to have variable wake times watching grandchildren    CURRENT HISTORY    On today's visit, the patient reports that she is doing well off the clonazepam and she continues to grind her teeth and she is on fluoxetine and her bruxism is worse  She would like to go to bed earlier. She is getting up rarely a couple times a week  Sleep Schedule: She goes to bed at 1130 PM and her  sleep latency is less than 30 minutes. She wakes by  6: 30 AM- 8 AM, .    RLS Followup:   SPPRLSFUSX: Current Treatment: Oral iron therapy.  She is having some residual RLS symptoms since weaning off clonazepam    ESS: 0       REVIEW OF SYSTEMS     REVIEW OF SYSTEMS  Review of Systems      ALLERGIES AND MEDICATIONS     ALLERGIES  Allergies   Allergen Reactions    Penicillins Hives and Rash    Adhesive Rash       MEDICATIONS: She has a current medication list which includes the following prescription(s): biotin - Take 1 tablet (10 mg) by mouth once daily, ferrous sulfate - Take 1 tablet by mouth once daily with breakfast. Do not crush, chew, or split. Ok to fill early on 7/25/2024, [place], fluoxetine - Take 1 capsule (20 mg) by mouth once daily in the morning, multivit-min/ferrous fumarate - 0 Refill(s), Type: Maintenance, omeprazole - Take 1 capsule (40 mg) by mouth once daily in the morning. Take before meals, vit a/vit c/vit e/zinc/copper - Take by mouth, zolpidem cr - Take by mouth, and clonazepam - Take 0.5 tablets (0.25 mg) by mouth once daily at bedtime. Taper off medication as discussed in the office.    PAST MEDICAL HISTORY : She  has a past medical history of GERD (gastroesophageal reflux disease) (Spring 2023), Insomnia (Years), and Sleep apnea, obstructive (March 2006).    PAST SURGICAL HISTORY: She  has no past surgical history on file.     FAMILY HISTORY: No changes since previous visit. Otherwise non-contributory as charted.       SOCIAL HISTORY  She  reports that she quit smoking about 33 years ago. Her smoking use included cigarettes. She started smoking about 51 years ago. She has a 18 pack-year smoking history. She has never used smokeless tobacco. She reports current alcohol use of about 10.0 standard drinks of alcohol per week. She reports that she does not use drugs.       PHYSICAL EXAM     VITAL SIGNS: /68   Pulse 69   SpO2 98%      CURRENT WEIGHT:  [unfilled]  BMI: [unfilled]  PREVIOUS WEIGHTS:  Wt Readings from Last 3 Encounters:   08/21/24 70.3 kg (155 lb)   06/13/24 70.3 kg (155 lb)   04/25/24 119 kg (262 lb)       Physical Exam  PHYSICAL EXAM: GENERAL: alert pleasant and cooperative no acute distress  PSYCH EXAM: alert,oriented, in NAD with a full range of affect, normal behavior and no psychotic features      RESULTS/DATA     Iron   Date Value   04/25/2024 85 ug/dL   09/01/2021 39 UG/DL   05/05/2018 74 UG/DL     % Saturation (%)   Date Value   04/25/2024 24 (L)     Iron Saturation (%)   Date Value   09/01/2021 17.8   05/05/2018 25.5     TIBC   Date Value   04/25/2024 352 ug/dL   09/01/2021 219 UG/DL (L)   05/05/2018 290 UG/DL     Ferritin   Date Value   04/25/2024 79 ng/mL   05/05/2018 254 NG/ML (H)       PAP Adherence  A PAP adherence download was obtained and data was reviewed personally today in clinic. (see scanned document in EPIC)  COMPLIANCE REPORT RESULTS: Date Range:  8/26/2024 - 9/24/2024 Days used: 100 > 4 hours usage:  93 % Average usage hours on dates used: 10 hours Residual AHI 1.2 on AutoPap 9 to 15 cm H2O      ASSESSMENT/PLAN     Ms. Escobedo is a 69 y.o. female and  has a past medical history of GERD (gastroesophageal reflux disease) (Spring 2023), Insomnia (Years), and Sleep apnea, obstructive (March 2006). She returns in followup to the [facility] for their Sleep Apnea and Pap Adherence Followup  RLS and chronic insomnia.    Problem List and Orders  Problem List Items Addressed This Visit             ICD-10-CM    Obstructive sleep apnea (adult) (pediatric) G47.33     Good compliance and therapy will continue with PAP therapy         Chronic insomnia F51.04     We have successfully weaned her down to minimum doses of clonazepam.  Next step is to wean her off completely.  She has utilized behavioral therapy techniques which have helped her manage her insomnia without medication.  She is very happy she  periodically takes Tylenol PM         Relevant Medications    clonazePAM (KlonoPIN) 0.5 mg tablet    Circadian rhythm sleep disorder, delayed sleep phase type G47.21     She like to be in bed earlier recommended she try to advance her bedtime to 15 minutes earlier         Restless leg syndrome G25.81     She is having some breakthrough RLS symptoms since she began weaning off of the clonazepam.  She does have low ferritin levels.  We will recheck her iron stores in 3 months.  She is recommend to continue with oral iron therapy.  If she continues to demonstrate RLS symptoms and we will consider iron infusion.  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

## 2024-09-26 NOTE — PATIENT INSTRUCTIONS
Take clonazepam 0.25 mg every other day for 2 weeks, then 2 times a week for 2 weeks, then discontinue

## 2024-10-03 ENCOUNTER — TELEPHONE (OUTPATIENT)
Dept: SLEEP MEDICINE | Facility: CLINIC | Age: 69
End: 2024-10-03
Payer: MEDICARE

## 2024-10-08 ENCOUNTER — TELEPHONE (OUTPATIENT)
Dept: PRIMARY CARE | Facility: CLINIC | Age: 69
End: 2024-10-08
Payer: MEDICARE

## 2024-10-08 DIAGNOSIS — R73.01 IMPAIRED FASTING GLUCOSE: ICD-10-CM

## 2024-10-08 DIAGNOSIS — Z79.899 MEDICATION MANAGEMENT: ICD-10-CM

## 2024-10-14 ENCOUNTER — APPOINTMENT (OUTPATIENT)
Dept: PRIMARY CARE | Facility: CLINIC | Age: 69
End: 2024-10-14
Payer: MEDICARE

## 2024-11-12 DIAGNOSIS — G25.81 RESTLESS LEG SYNDROME: ICD-10-CM

## 2024-11-12 DIAGNOSIS — Z79.899 MEDICATION MANAGEMENT: ICD-10-CM

## 2024-11-12 DIAGNOSIS — E83.10 DISORDER OF IRON METABOLISM: ICD-10-CM

## 2024-11-12 DIAGNOSIS — E83.10 IRON METABOLISM DISORDER: ICD-10-CM

## 2024-11-13 RX ORDER — FERROUS SULFATE 325(65) MG
TABLET ORAL
Qty: 30 TABLET | Refills: 2 | Status: SHIPPED | OUTPATIENT
Start: 2024-11-13

## 2024-12-20 DIAGNOSIS — K21.9 GASTROESOPHAGEAL REFLUX DISEASE WITHOUT ESOPHAGITIS: ICD-10-CM

## 2024-12-20 RX ORDER — OMEPRAZOLE 40 MG/1
40 CAPSULE, DELAYED RELEASE ORAL
Qty: 90 CAPSULE | Refills: 2 | Status: SHIPPED | OUTPATIENT
Start: 2024-12-20 | End: 2025-12-20

## 2024-12-23 ENCOUNTER — PATIENT MESSAGE (OUTPATIENT)
Dept: PRIMARY CARE | Facility: CLINIC | Age: 69
End: 2024-12-23
Payer: MEDICARE

## 2024-12-23 DIAGNOSIS — F41.8 DEPRESSION WITH ANXIETY: ICD-10-CM

## 2024-12-23 RX ORDER — ZOLPIDEM TARTRATE 12.5 MG/1
12.5 TABLET, FILM COATED, EXTENDED RELEASE ORAL NIGHTLY PRN
Qty: 30 TABLET | Refills: 0 | Status: SHIPPED | OUTPATIENT
Start: 2024-12-23

## 2024-12-24 ENCOUNTER — APPOINTMENT (OUTPATIENT)
Dept: SLEEP MEDICINE | Facility: CLINIC | Age: 69
End: 2024-12-24
Payer: MEDICARE

## 2024-12-30 ENCOUNTER — LAB (OUTPATIENT)
Dept: LAB | Facility: LAB | Age: 69
End: 2024-12-30
Payer: MEDICARE

## 2024-12-30 DIAGNOSIS — E83.10 DISORDER OF IRON METABOLISM: ICD-10-CM

## 2024-12-30 LAB
FERRITIN SERPL-MCNC: 134 NG/ML (ref 8–150)
IRON SATN MFR SERPL: 30 % (ref 25–45)
IRON SERPL-MCNC: 99 UG/DL (ref 35–150)
TIBC SERPL-MCNC: 328 UG/DL (ref 240–445)
UIBC SERPL-MCNC: 229 UG/DL (ref 110–370)

## 2024-12-30 PROCEDURE — 83540 ASSAY OF IRON: CPT

## 2024-12-30 PROCEDURE — 82728 ASSAY OF FERRITIN: CPT

## 2024-12-30 PROCEDURE — 83550 IRON BINDING TEST: CPT

## 2025-01-06 ENCOUNTER — TELEPHONE (OUTPATIENT)
Dept: SLEEP MEDICINE | Facility: HOSPITAL | Age: 70
End: 2025-01-06
Payer: MEDICARE

## 2025-01-06 NOTE — TELEPHONE ENCOUNTER
Called patient and left VM regarding the availability of lab results.  Sleep nurse phone number [phone] - given to call back for results and plan going forward.

## 2025-01-08 ENCOUNTER — APPOINTMENT (OUTPATIENT)
Dept: SLEEP MEDICINE | Facility: CLINIC | Age: 70
End: 2025-01-08
Payer: MEDICARE

## 2025-01-08 VITALS — DIASTOLIC BLOOD PRESSURE: 78 MMHG | SYSTOLIC BLOOD PRESSURE: 128 MMHG | HEART RATE: 66 BPM | OXYGEN SATURATION: 97 %

## 2025-01-08 DIAGNOSIS — F41.9 ANXIETY: ICD-10-CM

## 2025-01-08 DIAGNOSIS — G25.81 RESTLESS LEG SYNDROME: ICD-10-CM

## 2025-01-08 DIAGNOSIS — F51.04 CHRONIC INSOMNIA: Primary | ICD-10-CM

## 2025-01-08 DIAGNOSIS — G47.33 OBSTRUCTIVE SLEEP APNEA (ADULT) (PEDIATRIC): ICD-10-CM

## 2025-01-08 PROCEDURE — 1159F MED LIST DOCD IN RCRD: CPT | Performed by: INTERNAL MEDICINE

## 2025-01-08 PROCEDURE — 3074F SYST BP LT 130 MM HG: CPT | Performed by: INTERNAL MEDICINE

## 2025-01-08 PROCEDURE — 99214 OFFICE O/P EST MOD 30 MIN: CPT | Performed by: INTERNAL MEDICINE

## 2025-01-08 PROCEDURE — 1036F TOBACCO NON-USER: CPT | Performed by: INTERNAL MEDICINE

## 2025-01-08 PROCEDURE — 3078F DIAST BP <80 MM HG: CPT | Performed by: INTERNAL MEDICINE

## 2025-01-08 PROCEDURE — 1160F RVW MEDS BY RX/DR IN RCRD: CPT | Performed by: INTERNAL MEDICINE

## 2025-01-08 PROCEDURE — G2211 COMPLEX E/M VISIT ADD ON: HCPCS | Performed by: INTERNAL MEDICINE

## 2025-01-08 PROCEDURE — 1126F AMNT PAIN NOTED NONE PRSNT: CPT | Performed by: INTERNAL MEDICINE

## 2025-01-08 RX ORDER — VENLAFAXINE 75 MG/1
75 TABLET ORAL DAILY
COMMUNITY

## 2025-01-08 RX ORDER — VENLAFAXINE 37.5 MG/1
37.5 TABLET ORAL 2 TIMES DAILY
COMMUNITY

## 2025-01-08 ASSESSMENT — PAIN SCALES - GENERAL: PAINLEVEL_OUTOF10: 0-NO PAIN

## 2025-01-08 ASSESSMENT — SLEEP AND FATIGUE QUESTIONNAIRES
HOW LIKELY ARE YOU TO NOD OFF OR FALL ASLEEP WHILE SITTING AND TALKING TO SOMEONE: WOULD NEVER DOZE
HOW LIKELY ARE YOU TO NOD OFF OR FALL ASLEEP WHILE WATCHING TV: WOULD NEVER DOZE
SITING INACTIVE IN A PUBLIC PLACE LIKE A CLASS ROOM OR A MOVIE THEATER: WOULD NEVER DOZE
ESS-CHAD TOTAL SCORE: 0
HOW LIKELY ARE YOU TO NOD OFF OR FALL ASLEEP IN A CAR, WHILE STOPPED FOR A FEW MINUTES IN TRAFFIC: WOULD NEVER DOZE
HOW LIKELY ARE YOU TO NOD OFF OR FALL ASLEEP WHEN YOU ARE A PASSENGER IN A CAR FOR AN HOUR WITHOUT A BREAK: WOULD NEVER DOZE
HOW LIKELY ARE YOU TO NOD OFF OR FALL ASLEEP WHILE LYING DOWN TO REST IN THE AFTERNOON WHEN CIRCUMSTANCES PERMIT: WOULD NEVER DOZE
HOW LIKELY ARE YOU TO NOD OFF OR FALL ASLEEP WHILE SITTING QUIETLY AFTER LUNCH WITHOUT ALCOHOL: WOULD NEVER DOZE
HOW LIKELY ARE YOU TO NOD OFF OR FALL ASLEEP WHILE SITTING AND READING: WOULD NEVER DOZE

## 2025-01-08 NOTE — ASSESSMENT & PLAN NOTE
Iron stores are normal.  She can discontinue oral iron therapy.  RLS is exacerbated by venlafaxine recommended to work with behavioral health specialist to try a different medication

## 2025-01-08 NOTE — PROGRESS NOTES
Patient: Reyna Escobedo    [id number]  : 1955 -- AGE 69 y.o.    Provider: Anastacio Norton MD     Location [facility]   Service Date: 2025              [facility]  Followup Visit Note  Subjective   Patient ID: Reyna Escobedo is a 69 y.o. female who presents for Insomnia, Sleep Apnea, and Pap Adherence Followup.  HPI    Prior Sleep History:  2013: Split-night sleep study at [facility]: AHI 92, CPAP 11 cm H2O optimal.  Recommended AutoPap 9 to 15 cm H2O     2019: Office visit: Dr. Dante Bush: Reports difficulty with initiating and maintaining sleep for 1 year prior.  Was taken off Ambien 1 year ago was not working great as a sleep aid at that time was more difficulty sleeping.  She was placed on lorazepam 1 mg daily nightly which has not helped her either.  Reported history of patient being on SSRI.  He recommended titrating 9 to 15 cm H2O.  Reported consider repeat titration.  Suggested considering Remeron.  Recommended aggressive sleep hygiene counseling.  Referral to insomnia counselor.  Reported sleep correction was discussed and recommended    2023: Office Visit: Dr. Anastacio Norton:  Awake around 4 AM.  Twice a week she is to get up around 7 AM.  Find herself dozing off around 9 AM.  Takes melatonin at 9:30 PM.  Sleep scheduleUsual Bedtime 11 PM. Falls asleep around 11:30 PM. Wake time 7 to 10 AM. Total sleep time average is variable hours/day. Naps  Yes .Refreshing naps:   Yes     2023: Office Visit: Dr. Anastacio Norton: Presents today to review sleep diaries.  Suspicion is that she has a delayed circadian rhythm.  Discussed reducing melatonin and slowly weaning off Ambien as sleep patterns regulate.  Venting sleep schedule due to other obligations.  Review of sleep diary indicates a tendency towards delayed sleep.  Has single nocturnal awakening frequently.  She has been getting out of bed.  She also does take naps  most frequently once a day for up to 2 hours     12/11/2023: Office Visit: Dr. Anastacio Norton: Review of sleep diary indicates patient has overall improved her sleep schedule.  With wake time around 730 to 8 AM.  She is occasionally taking naps in the evening or the early afternoon on most days.  She continues to have nocturnal awakenings with difficulty getting back to sleep on most nights.  Caffeine use has decreased she does exercise in the afternoon occasionally.  She has been exercising which does help consolidate sleep.  Her bedtimes have been more consistent and she is getting somewhere between 7 to 8 hours of sleep at night and taking a 30-minute nap  She is off venlafaxine and on prozac now. . She did not do well with caffeine it kept her awake. She is taking the melatonin, clonazepam, biotin typically takes it around 9:30 PM.      1/11/2024: Office Visit Dr. Anastacio Norton: Here to review her sleep diary since her last visit.  Sleep diaries.  From 12/11/2023 to 1/11/2024.  Sleep diaries indicate average of 7 hours of sleep.  Napping behavior.  Nocturnal awakenings occasionally that appear to be shorter in duration than previously.  Continued dependence on medication for sleep. A downloaded compliance report was reviewed and was interpreted by myself as follows:  > 4 hour compliance was 100 %, with an average use of 10 hours and 9 minutes, with a residual AHI 1.0 on AutoPAP 9-15 cmH2O .    1/24/2024: Office Visit: Dr. Anastacio Norton: Per patient she called stating her PCP would longer prescribe her clonazepam and recommended that her sleep physician fill the prescription without notification from her PCP. She was only provided 5 tabs of clonazepam. She is here to discuss the clonazepam prescription which she is using to manage her insomnia. This was originally prescribed by a therapist and her PCP had taken over the prescription when the patient stopped seeing the therapist. She has also been prescribed  zolpidem which she takes PRN. We did not discuss the zolpidem at today's visit, other then me recommending that she does not take this with the clonazepam due to risk over unintentional overdose and death    2/22/2024: Office Visit: Dr. Anastacio Norton: Insomnia has improved with behavioral therapy.  We have reduced her clonazepam to 0.5 mg for the next 2 months.  Circadian rhythm disorder, delayed sleep phase type.  Has improved from previously with typical bedtime of 11 PM and wake time 7 AM    4/25/2024: Office Visit: Dr. Anastacio Norton: Difficulty falling asleep waking up every 2 hours.  Clock watching. She goes to bed at 11 PM and her sleep latency is 60 minutes. She wakes by  7-8 AM. She has 3 awakenings per night that last for 30 minutes. She gets about 6 hours of sleep per night. switched from venlafaxine to fluoxetine. She feels a need to control things.  Struggling with sleep since reducing to clonazepam 0.5 mg.  Not following stimulus control recommendations.  Ordered ferritin    6/13/2024: Office Visit: Dr. Anastacio Norton: Weaning down on clonazepam to 0.25 mg.  Patient is sleeping well at most recent visit.  Took Tylenol PM occasionally 1 to 2 days a week when caring for her grandson.  Started oral iron for low ferritin levels due to RLS.  Circadian rhythm delayed sleep phase continues to have variable wake times watching grandchildren    9/26/2024: Office Visit: Dr. Anastacio Norton: On today's visit, the patient reports that she is doing well off the clonazepam and she continues to grind her teeth and she is on fluoxetine and her bruxism is worse.  She would like to go to bed earlier. She is getting up rarely a couple times a week  Sleep Schedule: She goes to bed at 1130 PM and her sleep latency is less than 30 minutes. She wakes by  6: 30 AM- 8 AM, . RLS Followup: Current Treatment: Oral iron therapy.  She is having some residual RLS symptoms since weaning off clonazepam.  Obstructive sleep apnea  (adult) (pediatric) G47.33. Good compliance and therapy will continue with PAP therapy     Chronic insomnia F51.04. We have successfully weaned her down to minimum doses of clonazepam.  Next step is to wean her off completely.  She has utilized behavioral therapy techniques which have helped her manage her insomnia without medication.  She is very happy she periodically takes Tylenol PM.  She like to be in bed earlier recommended she try to advance her bedtime to 15 minutes earlier.     Restless leg syndrome G25.81.   She is having some breakthrough RLS symptoms since she began weaning off of the clonazepam.  She does have low ferritin levels.  We will recheck her iron stores in 3 months.  She is recommend to continue with oral iron therapy.  If she continues to demonstrate RLS symptoms and we will consider iron infusion.  We did discuss iron infusion as a treatment option.     Current Sleep History:  Reyna presents for follow-up on the management of her insomnia, RLS and sleep apnea which is currently being managed with positive airway pressure therapy.   She reports that her quality of sleep has worsened. Has not been sleeping as well   She was recently switched from fluoxetine to venlafaxine and had a recent increase in the medication.  She demonstrates some pressured speech with difficulty gathering her thoughts, but reports that she is having worsening difficulty with sleep.  Struggling to maintain sleep.  She has successfully weaned off of clonazepam and any other sleep aids.  But is having more issues with getting good quality sleep since starting the Effexor.  Her bruxism is no longer an issue if she has discontinued the fluoxetine.  She has gone a full day without sleep, but reports feeling exhausted.  She reports that she is not taking zolpidem but was recently prescribed this by her PCP at the end of December.   She reports her RLS symptoms have worsened despite having normal iron levels  She reports she  stopped drinking caffeine entirely    She will spend days where she is awake for 24 hours  She reports that she stopped taking the clonazepam for a few months now    She has worsening RLS symptoms    A downloaded compliance report was reviewed and was interpreted by myself as follows:  > 4 hour compliance was 93 %, with an average use of 8 hours and 39 minutes, with a residual AHI 2.4 .     She was switched from fluoxetine to venlafaxine and is no longer grinding her teeth.  She takes venlafaxine in the morning    Reyna Escobedo reports  good benefit from her device.    ESS: 0     Review of Systems  Review of systems negative except as per HPI  Objective   /78   Pulse 66   SpO2 97%    PREVIOUS WEIGHTS:  Wt Readings from Last 3 Encounters:   08/21/24 70.3 kg (155 lb)   06/13/24 70.3 kg (155 lb)   04/25/24 119 kg (262 lb)       Physical Exam  PHYSICAL EXAM: GENERAL: alert pleasant and cooperative no acute distress  PSYCH EXAM: alert,oriented, in NAD with a full range of affect, normal behavior and no psychotic features    Lab Results   Component Value Date    IRON 99 12/30/2024    TIBC 328 12/30/2024    FERRITIN 134 12/30/2024        Assessment/Plan   Problem List Items Addressed This Visit             ICD-10-CM    Obstructive sleep apnea (adult) (pediatric) G47.33     Doing well with current therapy continue with CPAP         Chronic insomnia - Primary F51.04     She is doing well off the clonazepam.  Exacerbated now by venlafaxine.  Recommend that she work with her behavioral health specialist to try a different medication other than venlafaxine as it is causing multiple sleep conditions including RLS and insomnia.    Follow-up in 3 months         Restless leg syndrome G25.81     Iron stores are normal.  She can discontinue oral iron therapy.  RLS is exacerbated by venlafaxine recommended to work with behavioral health specialist to try a different medication         Anxiety F41.9     She was recently  switched to venlafaxine which has disrupted her sleep and cause increased anxiety, headaches, worsening RLS.  She is almost hypomanic in the office with pressured speech and racing thoughts.  I did recommend that she contact her behavioral health specialist to discuss her side effects and to consider weaning her off or down on the venlafaxine.  She is currently taking venlafaxine 115 mg

## 2025-01-08 NOTE — ASSESSMENT & PLAN NOTE
She is doing well off the clonazepam.  Exacerbated now by venlafaxine.  Recommend that she work with her behavioral health specialist to try a different medication other than venlafaxine as it is causing multiple sleep conditions including RLS and insomnia.    Follow-up in 3 months

## 2025-01-08 NOTE — ASSESSMENT & PLAN NOTE
She was recently switched to venlafaxine which has disrupted her sleep and cause increased anxiety, headaches, worsening RLS.  She is almost hypomanic in the office with pressured speech and racing thoughts.  I did recommend that she contact her behavioral health specialist to discuss her side effects and to consider weaning her off or down on the venlafaxine.  She is currently taking venlafaxine 115 mg

## 2025-02-20 ENCOUNTER — HOSPITAL ENCOUNTER (OUTPATIENT)
Dept: RADIOLOGY | Facility: CLINIC | Age: 70
Discharge: HOME | End: 2025-02-20
Payer: MEDICARE

## 2025-02-20 DIAGNOSIS — Z12.31 SCREENING MAMMOGRAM FOR BREAST CANCER: ICD-10-CM

## 2025-02-26 ENCOUNTER — HOSPITAL ENCOUNTER (OUTPATIENT)
Dept: RADIOLOGY | Facility: CLINIC | Age: 70
Discharge: HOME | End: 2025-02-26
Payer: MEDICARE

## 2025-02-26 VITALS — WEIGHT: 240 LBS | HEIGHT: 65 IN | BODY MASS INDEX: 39.99 KG/M2

## 2025-02-26 PROCEDURE — 77067 SCR MAMMO BI INCL CAD: CPT

## 2025-02-26 PROCEDURE — 77067 SCR MAMMO BI INCL CAD: CPT | Performed by: STUDENT IN AN ORGANIZED HEALTH CARE EDUCATION/TRAINING PROGRAM

## 2025-02-26 PROCEDURE — 77063 BREAST TOMOSYNTHESIS BI: CPT | Performed by: STUDENT IN AN ORGANIZED HEALTH CARE EDUCATION/TRAINING PROGRAM

## 2025-03-05 ENCOUNTER — APPOINTMENT (OUTPATIENT)
Dept: SLEEP MEDICINE | Facility: CLINIC | Age: 70
End: 2025-03-05
Payer: MEDICARE

## 2025-03-05 VITALS
SYSTOLIC BLOOD PRESSURE: 114 MMHG | BODY MASS INDEX: 40.97 KG/M2 | HEIGHT: 64 IN | DIASTOLIC BLOOD PRESSURE: 68 MMHG | WEIGHT: 240 LBS | HEART RATE: 68 BPM

## 2025-03-05 DIAGNOSIS — G47.19 EXCESSIVE DAYTIME SLEEPINESS: ICD-10-CM

## 2025-03-05 DIAGNOSIS — G47.61 PERIODIC LIMB MOVEMENT DISORDER: ICD-10-CM

## 2025-03-05 DIAGNOSIS — F51.04 CHRONIC INSOMNIA: ICD-10-CM

## 2025-03-05 DIAGNOSIS — R61 NIGHT SWEATS: ICD-10-CM

## 2025-03-05 DIAGNOSIS — G47.33 OBSTRUCTIVE SLEEP APNEA (ADULT) (PEDIATRIC): Primary | ICD-10-CM

## 2025-03-05 PROCEDURE — 3008F BODY MASS INDEX DOCD: CPT | Performed by: INTERNAL MEDICINE

## 2025-03-05 PROCEDURE — 99214 OFFICE O/P EST MOD 30 MIN: CPT | Performed by: INTERNAL MEDICINE

## 2025-03-05 PROCEDURE — G2211 COMPLEX E/M VISIT ADD ON: HCPCS | Performed by: INTERNAL MEDICINE

## 2025-03-05 PROCEDURE — 1126F AMNT PAIN NOTED NONE PRSNT: CPT | Performed by: INTERNAL MEDICINE

## 2025-03-05 PROCEDURE — 1160F RVW MEDS BY RX/DR IN RCRD: CPT | Performed by: INTERNAL MEDICINE

## 2025-03-05 PROCEDURE — 1159F MED LIST DOCD IN RCRD: CPT | Performed by: INTERNAL MEDICINE

## 2025-03-05 PROCEDURE — 3074F SYST BP LT 130 MM HG: CPT | Performed by: INTERNAL MEDICINE

## 2025-03-05 PROCEDURE — 1036F TOBACCO NON-USER: CPT | Performed by: INTERNAL MEDICINE

## 2025-03-05 PROCEDURE — 3078F DIAST BP <80 MM HG: CPT | Performed by: INTERNAL MEDICINE

## 2025-03-05 ASSESSMENT — SLEEP AND FATIGUE QUESTIONNAIRES
ESS-CHAD TOTAL SCORE: 4
HOW LIKELY ARE YOU TO NOD OFF OR FALL ASLEEP IN A CAR, WHILE STOPPED FOR A FEW MINUTES IN TRAFFIC: WOULD NEVER DOZE
HOW LIKELY ARE YOU TO NOD OFF OR FALL ASLEEP WHILE SITTING AND TALKING TO SOMEONE: WOULD NEVER DOZE
SITING INACTIVE IN A PUBLIC PLACE LIKE A CLASS ROOM OR A MOVIE THEATER: WOULD NEVER DOZE
HOW LIKELY ARE YOU TO NOD OFF OR FALL ASLEEP WHILE SITTING AND READING: WOULD NEVER DOZE
HOW LIKELY ARE YOU TO NOD OFF OR FALL ASLEEP WHILE LYING DOWN TO REST IN THE AFTERNOON WHEN CIRCUMSTANCES PERMIT: WOULD NEVER DOZE
HOW LIKELY ARE YOU TO NOD OFF OR FALL ASLEEP WHILE WATCHING TV: WOULD NEVER DOZE
HOW LIKELY ARE YOU TO NOD OFF OR FALL ASLEEP WHEN YOU ARE A PASSENGER IN A CAR FOR AN HOUR WITHOUT A BREAK: MODERATE CHANCE OF DOZING
HOW LIKELY ARE YOU TO NOD OFF OR FALL ASLEEP WHILE SITTING QUIETLY AFTER LUNCH WITHOUT ALCOHOL: MODERATE CHANCE OF DOZING

## 2025-03-05 ASSESSMENT — PAIN SCALES - GENERAL: PAINLEVEL_OUTOF10: 0-NO PAIN

## 2025-03-05 NOTE — ASSESSMENT & PLAN NOTE
Describes leg movements that disrupt her sleep.  Her RLS symptoms appear to have overall improved with iron therapy which we have not discontinued.  She does have description of leg movements that may not be RLS and could be neurologically mediated secondary to lower back spinal issues as she reports leg twitching prior to bed.  Will continue to monitor will evaluate leg movements during polysomnography  
Given successfully able to take her off clonazepam.  She intermittently takes Ambien few times a month which is currently being prescribed by her PCP  
Possibly secondary to her CPAP being broken and not managing her sleep apnea well enough we will need to reevaluate.  There are other causes of night sweats that could potentially occur and she may need to get these evaluated by her PCP  
Reyna   has sleep apnea and requires treatment.  Reyna reports that her equipment is not working properly.  Reyna 's current CPAP is broken beyond repair.  Motor has exceeded life expectancy  Reyna demonstrates previous good compliance and benefit from PAP therapy   Reyna is a REPAP and needs a replacement with remote monitoring capabilities.  Will order a split night sleep study and order PAP therapy through [employer] once sleep study results are available, and bring her back for 31-90 day compliance  
Will reevaluate once we manage her sleep apnea with her new CPAP machine to see if this improves  
Controlled with current regime

## 2025-03-05 NOTE — PROGRESS NOTES
Patient: Reyna Escobedo    [id number]  : 1955 -- AGE 70 y.o.    Provider: Anastacio oNrton MD     Location [facility]   Service Date: 3/5/2025              [facility]  Followup Visit Note  Subjective   Patient ID: Reyna Escobedo is a 70 y.o. female who presents for Insomnia, Sleep Apnea, and Needs Pap Supplies/new Pap Machine.  HPI    Prior Sleep History:  2013: Split-night sleep study at [facility]: AHI 92, CPAP 11 cm H2O optimal.  Recommended AutoPap 9 to 15 cm H2O      2019: Office visit: Dr. Dante Bush: Reports difficulty with initiating and maintaining sleep for 1 year prior.  Was taken off Ambien 1 year ago was not working great as a sleep aid at that time was more difficulty sleeping.  She was placed on lorazepam 1 mg daily nightly which has not helped her either.  Reported history of patient being on SSRI.  He recommended titrating 9 to 15 cm H2O.  Reported consider repeat titration.  Suggested considering Remeron.  Recommended aggressive sleep hygiene counseling.  Referral to insomnia counselor.  Reported sleep correction was discussed and recommended     2023: Office Visit: Dr. Anastacio Norton:  Awake around 4 AM.  Twice a week she is to get up around 7 AM.  Find herself dozing off around 9 AM.  Takes melatonin at 9:30 PM.  Sleep scheduleUsual Bedtime 11 PM. Falls asleep around 11:30 PM. Wake time 7 to 10 AM. Total sleep time average is variable hours/day. Naps  Yes .Refreshing naps:   Yes      2023: Office Visit: Dr. Anastacio Norton: Presents today to review sleep diaries.  Suspicion is that she has a delayed circadian rhythm.  Discussed reducing melatonin and slowly weaning off Ambien as sleep patterns regulate.  Venting sleep schedule due to other obligations.  Review of sleep diary indicates a tendency towards delayed sleep.  Has single nocturnal awakening frequently.  She has been getting out of bed.  She also  does take naps most frequently once a day for up to 2 hours      12/11/2023: Office Visit: Dr. Anastacio Norton: Review of sleep diary indicates patient has overall improved her sleep schedule.  With wake time around 730 to 8 AM.  She is occasionally taking naps in the evening or the early afternoon on most days.  She continues to have nocturnal awakenings with difficulty getting back to sleep on most nights.  Caffeine use has decreased she does exercise in the afternoon occasionally.  She has been exercising which does help consolidate sleep.  Her bedtimes have been more consistent and she is getting somewhere between 7 to 8 hours of sleep at night and taking a 30-minute nap  She is off venlafaxine and on prozac now. . She did not do well with caffeine it kept her awake. She is taking the melatonin, clonazepam, biotin typically takes it around 9:30 PM.       1/11/2024: Office Visit Dr. Anastacio Norton: Here to review her sleep diary since her last visit.  Sleep diaries.  From 12/11/2023 to 1/11/2024.  Sleep diaries indicate average of 7 hours of sleep.  Napping behavior.  Nocturnal awakenings occasionally that appear to be shorter in duration than previously.  Continued dependence on medication for sleep. A downloaded compliance report was reviewed and was interpreted by myself as follows:  > 4 hour compliance was 100 %, with an average use of 10 hours and 9 minutes, with a residual AHI 1.0 on AutoPAP 9-15 cmH2O .     1/24/2024: Office Visit: Dr. Anastacio Norton: Per patient she called stating her PCP would longer prescribe her clonazepam and recommended that her sleep physician fill the prescription without notification from her PCP. She was only provided 5 tabs of clonazepam. She is here to discuss the clonazepam prescription which she is using to manage her insomnia. This was originally prescribed by a therapist and her PCP had taken over the prescription when the patient stopped seeing the therapist. She has  also been prescribed zolpidem which she takes PRN. We did not discuss the zolpidem at today's visit, other then me recommending that she does not take this with the clonazepam due to risk over unintentional overdose and death     2/22/2024: Office Visit: Dr. Anastacio Norton: Insomnia has improved with behavioral therapy.  We have reduced her clonazepam to 0.5 mg for the next 2 months.  Circadian rhythm disorder, delayed sleep phase type.  Has improved from previously with typical bedtime of 11 PM and wake time 7 AM     4/25/2024: Office Visit: Dr. Anastacio Norton: Difficulty falling asleep waking up every 2 hours.  Clock watching. She goes to bed at 11 PM and her sleep latency is 60 minutes. She wakes by  7-8 AM. She has 3 awakenings per night that last for 30 minutes. She gets about 6 hours of sleep per night. switched from venlafaxine to fluoxetine. She feels a need to control things.  Struggling with sleep since reducing to clonazepam 0.5 mg.  Not following stimulus control recommendations.  Ordered ferritin     6/13/2024: Office Visit: Dr. Anastacio Norton: Weaning down on clonazepam to 0.25 mg.  Patient is sleeping well at most recent visit.  Took Tylenol PM occasionally 1 to 2 days a week when caring for her grandson.  Started oral iron for low ferritin levels due to RLS.  Circadian rhythm delayed sleep phase continues to have variable wake times watching grandchildren     9/26/2024: Office Visit: Dr. Anastacio Norton: On today's visit, the patient reports that she is doing well off the clonazepam and she continues to grind her teeth and she is on fluoxetine and her bruxism is worse.  She would like to go to bed earlier. She is getting up rarely a couple times a week  Sleep Schedule: She goes to bed at 1130 PM and her sleep latency is less than 30 minutes. She wakes by  6: 30 AM- 8 AM, . RLS Followup: Current Treatment: Oral iron therapy.  She is having some residual RLS symptoms since weaning off clonazepam.  Chronic insomnia F51.04. We have successfully weaned her down to minimum doses of clonazepam.  Next step is to wean her off completely.  She has utilized behavioral therapy techniques which have helped her manage her insomnia without medication.  She is very happy she periodically takes Tylenol PM.  She like to be in bed earlier recommended she try to advance her bedtime to 15 minutes earlier.  Restless leg syndrome G25.81.   She is having some breakthrough RLS symptoms since she began weaning off of the clonazepam.  She does have low ferritin levels.  We will recheck her iron stores in 3 months.  She is recommend to continue with oral iron therapy.  If she continues to demonstrate RLS symptoms and we will consider iron infusion.  We did discuss iron infusion as a treatment option.     1/8/2025: Office Visit: Dr. Anastacio Norton: Reyna presents for follow-up on the management of her insomnia, RLS and sleep apnea which is currently being managed with positive airway pressure therapy. She reports that her quality of sleep has worsened. Has not been sleeping as well. She was recently switched from fluoxetine to venlafaxine and had a recent increase in the medication. She demonstrates some pressured speech with difficulty gathering her thoughts, but reports that she is having worsening difficulty with sleep.  Struggling to maintain sleep.  She has successfully weaned off of clonazepam and any other sleep aids.  But is having more issues with getting good quality sleep since starting the Effexor.  Her bruxism is no longer an issue if she has discontinued the fluoxetine. She has gone a full day without sleep, but reports feeling exhausted.  She reports that she is not taking zolpidem but was recently prescribed this by her PCP at the end of December.   She reports her RLS symptoms have worsened despite having normal iron levels. She reports she stopped drinking caffeine entirely. She will spend days where she is awake for  "24 hours. She reports that she stopped taking the clonazepam for a few months now. She has worsening RLS symptoms. A downloaded compliance report was reviewed and was interpreted by myself as follows:  > 4 hour compliance was 93 %, with an average use of 8 hours and 39 minutes, with a residual AHI 2.4 .  She was switched from fluoxetine to venlafaxine and is no longer grinding her teeth. She takes venlafaxine in the morning  Compliant with CPAP. ESS: 0   Chronic insomnia - Primary F51.04.   She is doing well off the clonazepam.  Exacerbated now by venlafaxine.  Recommend that she work with her behavioral health specialist to try a different medication other than venlafaxine as it is causing multiple sleep conditions including RLS and insomnia.  Restless leg syndrome G25.81.  Iron stores are normal.  She can discontinue oral iron therapy.  RLS is exacerbated by venlafaxine recommended to work with behavioral health specialist to try a different medication  Anxiety F41.9.   She was recently switched to venlafaxine which has disrupted her sleep and cause increased anxiety, headaches, worsening RLS.  She is almost hypomanic in the office with pressured speech and racing thoughts.  I did recommend that she contact her behavioral health specialist to discuss her side effects and to consider weaning her off or down on the venlafaxine.  She is currently taking venlafaxine 115 mg. It     Current Sleep History:  Reyna presents for follow-up on the management of her sleep apnea which is currently being managed with positive airway pressure therapy.   She has successfully been weaned off clonazepam, venlafaxine and intermittently uses Ambien CR 12.5 mg which is prescribed by her PCP only a few days a month  She reports that she is sleeping better overall, but feels she is \"freezing when going to bed\" and waking up with night sweats multiple times at night.  She reports having to change her close 3-4 times in the middle of the " "night.     She reports unusual sensations in her legs at bedtime that is disruptive to her sleep.  Describes them as feeling like \"muscles tingling\" (feels like pins and needles), \"then the muscle tightens and it jerks\" and it is usually one or the other. Timing, it only occurs in the evening at bedtime, gets up to move around occasionally, with some relief, sometimes it reoccurs later in the night, some days it can occur for 30 minutes and sometimes it is intermittent throughout the night. It is disruptive to her sleep  She has eliminated caffeine  Social drinker, infrequent and is certain that this does not trigger the leg movements  Has some aching in her lower back, but denies knowledge of back issues  Leg sensations wake her up from her sleep. Can feel it prior to bed    A downloaded compliance report was reviewed and was interpreted by myself as follows:  > 4 hour compliance was 97%, with an average use of 8 hours and 18 minutes, with a residual AHI 3.8 on AutoPap 9 to 15 cm H2O.    She takes ambien 2-3 times a month due to the leg bothering her and feels tired. Takes it infrequently. Takes melatonin on occasion with intermittent assistance  She report that she feels sleepy during the daytime.     The patient's current CPAP is broken beyond repair. Reyna Escobedo needs a replacement with remote monitoring capabilities.     Reyna Escobedo reports good benefit from her device. ESS: 4     Review of Systems  Review of systems negative except as per HPI  Objective   /68   Pulse 68   Ht 1.626 m (5' 4\")   Wt 109 kg (240 lb)   BMI 41.20 kg/m²    PREVIOUS WEIGHTS:  Wt Readings from Last 3 Encounters:   03/05/25 109 kg (240 lb)   02/26/25 109 kg (240 lb)   08/21/24 70.3 kg (155 lb)       Physical Exam  PHYSICAL EXAM: GENERAL: alert pleasant and cooperative no acute distress  PSYCH EXAM: alert,oriented, in NAD with a full range of affect, normal behavior and no psychotic features    Lab Results "   Component Value Date    IRON 99 12/30/2024    TIBC 328 12/30/2024    FERRITIN 134 12/30/2024        Assessment/Plan   Problem List Items Addressed This Visit             ICD-10-CM    Obstructive sleep apnea (adult) (pediatric) - Primary G47.33     Reyna   has sleep apnea and requires treatment.  Reyna reports that her equipment is not working properly.  Reyna 's current CPAP is broken beyond repair.  Motor has exceeded life expectancy  Reyna demonstrates previous good compliance and benefit from PAP therapy   Reyna is a REPAP and needs a replacement with remote monitoring capabilities.  Will order a split night sleep study and order PAP therapy through [facility], once sleep study results are available, and bring her back for 31-90 day compliance         Relevant Orders    In-Center Sleep Study (Sleep Provider Only)    Chronic insomnia F51.04     Given successfully able to take her off clonazepam.  She intermittently takes Ambien few times a month which is currently being prescribed by her PCP         Relevant Orders    In-Center Sleep Study (Sleep Provider Only)    Periodic limb movement disorder G47.61     Describes leg movements that disrupt her sleep.  Her RLS symptoms appear to have overall improved with iron therapy which we have not discontinued.  She does have description of leg movements that may not be RLS and could be neurologically mediated secondary to lower back spinal issues as she reports leg twitching prior to bed.  Will continue to monitor will evaluate leg movements during polysomnography         Relevant Orders    In-Center Sleep Study (Sleep Provider Only)    Night sweats R61     Possibly secondary to her CPAP being broken and not managing her sleep apnea well enough we will need to reevaluate.  There are other causes of night sweats that could potentially occur and she may need to get these evaluated by her PCP         Excessive daytime sleepiness G47.19     Will reevaluate once  we manage her sleep apnea with her new CPAP machine to see if this improves

## 2025-03-05 NOTE — LETTER
2025     Terrance Garcia MD  [address] [place] [facility]  [address]  [place] [address]    Patient: Reyna Escobedo   YOB: 1955   Date of Visit: 3/5/2025       Dear Dr. Terrance Garcia MD:    Thank you for referring Reyna Escobedo to me for evaluation. Below are my notes for this consultation.  If you have questions, please do not hesitate to call me. I look forward to following your patient along with you.       Sincerely,     Anastacio Norton MD      CC: No Recipients  ______________________________________________________________________________________         Patient: Reyna Escobedo    [id number]  : 1955 -- AGE 70 y.o.    Provider: Anastacio Norton MD     Location [facility]   Service Date: 3/5/2025              [facility]  Followup Visit Note  Subjective  Patient ID: Reyna Escobedo is a 70 y.o. female who presents for Insomnia, Sleep Apnea, and Needs Pap Supplies/new Pap Machine.  HPI    Prior Sleep History:  2013: Split-night sleep study at [facility]: AHI 92, CPAP 11 cm H2O optimal.  Recommended AutoPap 9 to 15 cm H2O      2019: Office visit: Dr. Dante Bush: Reports difficulty with initiating and maintaining sleep for 1 year prior.  Was taken off Ambien 1 year ago was not working great as a sleep aid at that time was more difficulty sleeping.  She was placed on lorazepam 1 mg daily nightly which has not helped her either.  Reported history of patient being on SSRI.  He recommended titrating 9 to 15 cm H2O.  Reported consider repeat titration.  Suggested considering Remeron.  Recommended aggressive sleep hygiene counseling.  Referral to insomnia counselor.  Reported sleep correction was discussed and recommended     2023: Office Visit: Dr. Anastacio Norton:  Awake around 4 AM.  Twice a week she is to get up around 7 AM.  Find herself dozing off around 9 AM.  Takes melatonin at 9:30 PM.   Sleep scheduleUsual Bedtime 11 PM. Falls asleep around 11:30 PM. Wake time 7 to 10 AM. Total sleep time average is variable hours/day. Naps  Yes .Refreshing naps:   Yes      11/20/2023: Office Visit: Dr. Anastacio Norton: Presents today to review sleep diaries.  Suspicion is that she has a delayed circadian rhythm.  Discussed reducing melatonin and slowly weaning off Ambien as sleep patterns regulate.  Venting sleep schedule due to other obligations.  Review of sleep diary indicates a tendency towards delayed sleep.  Has single nocturnal awakening frequently.  She has been getting out of bed.  She also does take naps most frequently once a day for up to 2 hours      12/11/2023: Office Visit: Dr. Anastacoi Norton: Review of sleep diary indicates patient has overall improved her sleep schedule.  With wake time around 730 to 8 AM.  She is occasionally taking naps in the evening or the early afternoon on most days.  She continues to have nocturnal awakenings with difficulty getting back to sleep on most nights.  Caffeine use has decreased she does exercise in the afternoon occasionally.  She has been exercising which does help consolidate sleep.  Her bedtimes have been more consistent and she is getting somewhere between 7 to 8 hours of sleep at night and taking a 30-minute nap  She is off venlafaxine and on prozac now. . She did not do well with caffeine it kept her awake. She is taking the melatonin, clonazepam, biotin typically takes it around 9:30 PM.       1/11/2024: Office Visit Dr. Anastacio Norton: Here to review her sleep diary since her last visit.  Sleep diaries.  From 12/11/2023 to 1/11/2024.  Sleep diaries indicate average of 7 hours of sleep.  Napping behavior.  Nocturnal awakenings occasionally that appear to be shorter in duration than previously.  Continued dependence on medication for sleep. A downloaded compliance report was reviewed and was interpreted by myself as follows:  > 4 hour compliance was 100  %, with an average use of 10 hours and 9 minutes, with a residual AHI 1.0 on AutoPAP 9-15 cmH2O .     1/24/2024: Office Visit: Dr. Anastacio Norton: Per patient she called stating her PCP would longer prescribe her clonazepam and recommended that her sleep physician fill the prescription without notification from her PCP. She was only provided 5 tabs of clonazepam. She is here to discuss the clonazepam prescription which she is using to manage her insomnia. This was originally prescribed by a therapist and her PCP had taken over the prescription when the patient stopped seeing the therapist. She has also been prescribed zolpidem which she takes PRN. We did not discuss the zolpidem at today's visit, other then me recommending that she does not take this with the clonazepam due to risk over unintentional overdose and death     2/22/2024: Office Visit: Dr. Anastacio Norton: Insomnia has improved with behavioral therapy.  We have reduced her clonazepam to 0.5 mg for the next 2 months.  Circadian rhythm disorder, delayed sleep phase type.  Has improved from previously with typical bedtime of 11 PM and wake time 7 AM     4/25/2024: Office Visit: Dr. Anastacio Norton: Difficulty falling asleep waking up every 2 hours.  Clock watching. She goes to bed at 11 PM and her sleep latency is 60 minutes. She wakes by  7-8 AM. She has 3 awakenings per night that last for 30 minutes. She gets about 6 hours of sleep per night. switched from venlafaxine to fluoxetine. She feels a need to control things.  Struggling with sleep since reducing to clonazepam 0.5 mg.  Not following stimulus control recommendations.  Ordered ferritin     6/13/2024: Office Visit: Dr. Anastacio Norton: Weaning down on clonazepam to 0.25 mg.  Patient is sleeping well at most recent visit.  Took Tylenol PM occasionally 1 to 2 days a week when caring for her grandson.  Started oral iron for low ferritin levels due to RLS.  Circadian rhythm delayed sleep phase  continues to have variable wake times watching grandchildren     9/26/2024: Office Visit: Dr. Anastacio Norton: On today's visit, the patient reports that she is doing well off the clonazepam and she continues to grind her teeth and she is on fluoxetine and her bruxism is worse.  She would like to go to bed earlier. She is getting up rarely a couple times a week  Sleep Schedule: She goes to bed at 1130 PM and her sleep latency is less than 30 minutes. She wakes by  6: 30 AM- 8 AM, . RLS Followup: Current Treatment: Oral iron therapy.  She is having some residual RLS symptoms since weaning off clonazepam. Chronic insomnia F51.04. We have successfully weaned her down to minimum doses of clonazepam.  Next step is to wean her off completely.  She has utilized behavioral therapy techniques which have helped her manage her insomnia without medication.  She is very happy she periodically takes Tylenol PM.  She like to be in bed earlier recommended she try to advance her bedtime to 15 minutes earlier.  Restless leg syndrome G25.81.   She is having some breakthrough RLS symptoms since she began weaning off of the clonazepam.  She does have low ferritin levels.  We will recheck her iron stores in 3 months.  She is recommend to continue with oral iron therapy.  If she continues to demonstrate RLS symptoms and we will consider iron infusion.  We did discuss iron infusion as a treatment option.     1/8/2025: Office Visit: Dr. Anastacio Norton: Reyna presents for follow-up on the management of her insomnia, RLS and sleep apnea which is currently being managed with positive airway pressure therapy. She reports that her quality of sleep has worsened. Has not been sleeping as well. She was recently switched from fluoxetine to venlafaxine and had a recent increase in the medication. She demonstrates some pressured speech with difficulty gathering her thoughts, but reports that she is having worsening difficulty with sleep.  Struggling  to maintain sleep.  She has successfully weaned off of clonazepam and any other sleep aids.  But is having more issues with getting good quality sleep since starting the Effexor.  Her bruxism is no longer an issue if she has discontinued the fluoxetine. She has gone a full day without sleep, but reports feeling exhausted.  She reports that she is not taking zolpidem but was recently prescribed this by her PCP at the end of December.   She reports her RLS symptoms have worsened despite having normal iron levels. She reports she stopped drinking caffeine entirely. She will spend days where she is awake for 24 hours. She reports that she stopped taking the clonazepam for a few months now. She has worsening RLS symptoms. A downloaded compliance report was reviewed and was interpreted by myself as follows:  > 4 hour compliance was 93 %, with an average use of 8 hours and 39 minutes, with a residual AHI 2.4 .  She was switched from fluoxetine to venlafaxine and is no longer grinding her teeth. She takes venlafaxine in the morning  Compliant with CPAP. ESS: 0   Chronic insomnia - Primary F51.04.   She is doing well off the clonazepam.  Exacerbated now by venlafaxine.  Recommend that she work with her behavioral health specialist to try a different medication other than venlafaxine as it is causing multiple sleep conditions including RLS and insomnia.  Restless leg syndrome G25.81.  Iron stores are normal.  She can discontinue oral iron therapy.  RLS is exacerbated by venlafaxine recommended to work with behavioral health specialist to try a different medication  Anxiety F41.9.   She was recently switched to venlafaxine which has disrupted her sleep and cause increased anxiety, headaches, worsening RLS.  She is almost hypomanic in the office with pressured speech and racing thoughts.  I did recommend that she contact her behavioral health specialist to discuss her side effects and to consider weaning her off or down on the  "venlafaxine.  She is currently taking venlafaxine 115 mg. It     Current Sleep History:  Reyna presents for follow-up on the management of her sleep apnea which is currently being managed with positive airway pressure therapy.   She has successfully been weaned off clonazepam, venlafaxine and intermittently uses Ambien CR 12.5 mg which is prescribed by her PCP only a few days a month  She reports that she is sleeping better overall, but feels she is \"freezing when going to bed\" and waking up with night sweats multiple times at night.  She reports having to change her close 3-4 times in the middle of the night.     She reports unusual sensations in her legs at bedtime that is disruptive to her sleep.  Describes them as feeling like \"muscles tingling\" (feels like pins and needles), \"then the muscle tightens and it jerks\" and it is usually one or the other. Timing, it only occurs in the evening at bedtime, gets up to move around occasionally, with some relief, sometimes it reoccurs later in the night, some days it can occur for 30 minutes and sometimes it is intermittent throughout the night. It is disruptive to her sleep  She has eliminated caffeine  Social drinker, infrequent and is certain that this does not trigger the leg movements  Has some aching in her lower back, but denies knowledge of back issues  Leg sensations wake her up from her sleep. Can feel it prior to bed    A downloaded compliance report was reviewed and was interpreted by myself as follows:  > 4 hour compliance was 97%, with an average use of 8 hours and 18 minutes, with a residual AHI 3.8 on AutoPap 9 to 15 cm H2O.    She takes ambien 2-3 times a month due to the leg bothering her and feels tired. Takes it infrequently. Takes melatonin on occasion with intermittent assistance  She report that she feels sleepy during the daytime.     The patient's current CPAP is broken beyond repair. Reyna Perez Gregg needs a replacement with remote monitoring " "capabilities.     Reyna Escobedo reports good benefit from her device. ESS: 4     Review of Systems  Review of systems negative except as per HPI  Objective  /68   Pulse 68   Ht 1.626 m (5' 4\")   Wt 109 kg (240 lb)   BMI 41.20 kg/m²    PREVIOUS WEIGHTS:  Wt Readings from Last 3 Encounters:   03/05/25 109 kg (240 lb)   02/26/25 109 kg (240 lb)   08/21/24 70.3 kg (155 lb)       Physical Exam  PHYSICAL EXAM: GENERAL: alert pleasant and cooperative no acute distress  PSYCH EXAM: alert,oriented, in NAD with a full range of affect, normal behavior and no psychotic features    Lab Results   Component Value Date    IRON 99 12/30/2024    TIBC 328 12/30/2024    FERRITIN 134 12/30/2024        Assessment/Plan  Problem List Items Addressed This Visit             ICD-10-CM    Obstructive sleep apnea (adult) (pediatric) - Primary G47.33     Reyna   has sleep apnea and requires treatment.  Ryena reports that her equipment is not working properly.  Reyna 's current CPAP is broken beyond repair.  Motor has exceeded life expectancy  Reyna demonstrates previous good compliance and benefit from PAP therapy   Reyna is a REPAP and needs a replacement with remote monitoring capabilities.  Will order a split night sleep study and order PAP therapy through [employer], once sleep study results are available, and bring her back for 31-90 day compliance         Relevant Orders    In-Center Sleep Study (Sleep Provider Only)    Chronic insomnia F51.04     Given successfully able to take her off clonazepam.  She intermittently takes Ambien few times a month which is currently being prescribed by her PCP         Relevant Orders    In-Center Sleep Study (Sleep Provider Only)    Periodic limb movement disorder G47.61     Describes leg movements that disrupt her sleep.  Her RLS symptoms appear to have overall improved with iron therapy which we have not discontinued.  She does have description of leg movements that may not " be RLS and could be neurologically mediated secondary to lower back spinal issues as she reports leg twitching prior to bed.  Will continue to monitor will evaluate leg movements during polysomnography         Relevant Orders    In-Center Sleep Study (Sleep Provider Only)    Night sweats R61     Possibly secondary to her CPAP being broken and not managing her sleep apnea well enough we will need to reevaluate.  There are other causes of night sweats that could potentially occur and she may need to get these evaluated by her PCP         Excessive daytime sleepiness G47.19     Will reevaluate once we manage her sleep apnea with her new CPAP machine to see if this improves

## 2025-03-07 ENCOUNTER — APPOINTMENT (OUTPATIENT)
Dept: PRIMARY CARE | Facility: CLINIC | Age: 70
End: 2025-03-07
Payer: MEDICARE

## 2025-03-07 ENCOUNTER — OFFICE VISIT (OUTPATIENT)
Dept: PRIMARY CARE | Facility: CLINIC | Age: 70
End: 2025-03-07
Payer: MEDICARE

## 2025-03-07 VITALS
BODY MASS INDEX: 46.86 KG/M2 | OXYGEN SATURATION: 94 % | WEIGHT: 273 LBS | SYSTOLIC BLOOD PRESSURE: 130 MMHG | TEMPERATURE: 97 F | DIASTOLIC BLOOD PRESSURE: 70 MMHG | HEART RATE: 59 BPM

## 2025-03-07 DIAGNOSIS — R21 RASH AND OTHER NONSPECIFIC SKIN ERUPTION: Primary | ICD-10-CM

## 2025-03-07 PROCEDURE — 3075F SYST BP GE 130 - 139MM HG: CPT

## 2025-03-07 PROCEDURE — 1036F TOBACCO NON-USER: CPT

## 2025-03-07 PROCEDURE — 99213 OFFICE O/P EST LOW 20 MIN: CPT

## 2025-03-07 PROCEDURE — 1159F MED LIST DOCD IN RCRD: CPT

## 2025-03-07 PROCEDURE — 1126F AMNT PAIN NOTED NONE PRSNT: CPT

## 2025-03-07 PROCEDURE — 1160F RVW MEDS BY RX/DR IN RCRD: CPT

## 2025-03-07 PROCEDURE — 3078F DIAST BP <80 MM HG: CPT

## 2025-03-07 RX ORDER — PREDNISONE 20 MG/1
40 TABLET ORAL DAILY
Qty: 10 TABLET | Refills: 0 | Status: SHIPPED | OUTPATIENT
Start: 2025-03-07 | End: 2025-03-12

## 2025-03-07 RX ORDER — NYSTATIN 100000 U/G
CREAM TOPICAL 2 TIMES DAILY
Qty: 30 G | Refills: 0 | Status: SHIPPED | OUTPATIENT
Start: 2025-03-07 | End: 2025-03-14

## 2025-03-07 ASSESSMENT — PATIENT HEALTH QUESTIONNAIRE - PHQ9
SUM OF ALL RESPONSES TO PHQ9 QUESTIONS 1 AND 2: 0
2. FEELING DOWN, DEPRESSED OR HOPELESS: NOT AT ALL
1. LITTLE INTEREST OR PLEASURE IN DOING THINGS: NOT AT ALL

## 2025-03-07 ASSESSMENT — PAIN SCALES - GENERAL: PAINLEVEL_OUTOF10: 0-NO PAIN

## 2025-03-07 NOTE — PROGRESS NOTES
Subjective   Patient ID: Reyna Escobedo is a 70 y.o. female who presents for Hives (Pt has been experiencing Itching and burning for the past couple of months in her pubic area. The itching and burning started in the anal area 2 weeks ago. Pt is also experiencing a red, itchy rash that began on her neck and has spread to her chest. Pt had been taking an antihistamine and using cortisone cream and only get temporary relief. Pt hasn't made any recent changes in diet or detergent. Pt states they go to sleep freezing and wake up sweating. Pt notes that symptoms worsen at night.).    HPI   Itching and burning of pubic area for one year. Rectal itching and burning for 2 weeks.   Rash on neck and chest for 4 days, stayed the same. Has not had this before.   Takes antihistamine and topical steroid with mild short term relief.   No new foods, meds, detergents.   No SOB, cough, swelling of lips, tongue, vaginal bleeding/itching/odor/discharge, no blood in stool, diarrhea, constipation.     Review of Systems  All other systems have been reviewed and are negative except as noted in the HPI.     Objective   /70 (BP Location: Left arm, Patient Position: Sitting)   Pulse 59   Temp 36.1 °C (97 °F) (Temporal)   Wt 124 kg (273 lb)   SpO2 94%   BMI 46.86 kg/m²     Physical Exam  Vitals and nursing note reviewed. Chaperone present: Declines chaperone.   Constitutional:       General: She is not in acute distress.     Appearance: She is obese.   HENT:      Mouth/Throat:      Mouth: Mucous membranes are moist.   Eyes:      Extraocular Movements: Extraocular movements intact.      Conjunctiva/sclera: Conjunctivae normal.   Cardiovascular:      Rate and Rhythm: Normal rate and regular rhythm.   Pulmonary:      Effort: Pulmonary effort is normal.      Breath sounds: Normal breath sounds.   Chest:      Comments: Macular rash of upper chest, spares face/arms. Erythematous rash, macerated skin between breasts and in creases of  axilla. Vulva erythematous however patient states this does not cause her discomfort. No rash or skin changes in area of discomfort near pubic symphysis. Area of red, macerated skin of rectal area. No hemorrhoids, fissures noted.   Musculoskeletal:      Cervical back: Neck supple.   Neurological:      General: No focal deficit present.      Mental Status: She is alert.   Psychiatric:         Mood and Affect: Mood normal.         Assessment/Plan   Assessment & Plan  Rash and other nonspecific skin eruption  Acute.   Prednisone as directed for rash on upper chest. Risks and benefits of medication discussed and prescribed.   Apply Nystatin cream as directed to rash between/underneath breasts, rectal area. Risks and benefits of medication discussed and prescribed.   Avoid use of topical steroid in groin region.  Keep affected areas clean and dry. Avoid tight fitting clothing.    Follow up with PCP if no improvements within 1 week or sooner for worsening.     Orders:    nystatin (Mycostatin) cream; Apply topically 2 times a day for 7 days. apply to affected area    predniSONE (Deltasone) 20 mg tablet; Take 2 tablets (40 mg) by mouth once daily for 5 days.

## 2025-04-02 ENCOUNTER — CLINICAL SUPPORT (OUTPATIENT)
Dept: SLEEP MEDICINE | Facility: HOSPITAL | Age: 70
End: 2025-04-02
Payer: MEDICARE

## 2025-04-02 DIAGNOSIS — G47.61 PERIODIC LIMB MOVEMENT DISORDER: ICD-10-CM

## 2025-04-02 DIAGNOSIS — F51.04 CHRONIC INSOMNIA: ICD-10-CM

## 2025-04-02 DIAGNOSIS — G47.33 OBSTRUCTIVE SLEEP APNEA (ADULT) (PEDIATRIC): ICD-10-CM

## 2025-04-02 ASSESSMENT — SLEEP AND FATIGUE QUESTIONNAIRES
SITING INACTIVE IN A PUBLIC PLACE LIKE A CLASS ROOM OR A MOVIE THEATER: WOULD NEVER DOZE
ESS-CHAD TOTAL SCORE: 5
HOW LIKELY ARE YOU TO NOD OFF OR FALL ASLEEP IN A CAR, WHILE STOPPED FOR A FEW MINUTES IN TRAFFIC: WOULD NEVER DOZE
HOW LIKELY ARE YOU TO NOD OFF OR FALL ASLEEP WHILE WATCHING TV: WOULD NEVER DOZE
HOW LIKELY ARE YOU TO NOD OFF OR FALL ASLEEP WHILE SITTING AND TALKING TO SOMEONE: WOULD NEVER DOZE
HOW LIKELY ARE YOU TO NOD OFF OR FALL ASLEEP WHILE SITTING AND READING: WOULD NEVER DOZE
HOW LIKELY ARE YOU TO NOD OFF OR FALL ASLEEP WHILE LYING DOWN TO REST IN THE AFTERNOON WHEN CIRCUMSTANCES PERMIT: HIGH CHANCE OF DOZING
HOW LIKELY ARE YOU TO NOD OFF OR FALL ASLEEP WHEN YOU ARE A PASSENGER IN A CAR FOR AN HOUR WITHOUT A BREAK: MODERATE CHANCE OF DOZING
HOW LIKELY ARE YOU TO NOD OFF OR FALL ASLEEP WHILE SITTING QUIETLY AFTER LUNCH WITHOUT ALCOHOL: WOULD NEVER DOZE

## 2025-04-03 VITALS
SYSTOLIC BLOOD PRESSURE: 154 MMHG | WEIGHT: 274.91 LBS | DIASTOLIC BLOOD PRESSURE: 89 MMHG | HEIGHT: 64 IN | OXYGEN SATURATION: 97 % | BODY MASS INDEX: 46.93 KG/M2 | RESPIRATION RATE: 12 BRPM | HEART RATE: 55 BPM

## 2025-04-03 NOTE — PROGRESS NOTES
[facility] TECH NOTE:     Patient: Reyna Escobedo   MRN//AGE: [id number]  1955  70 y.o.   Technologist: TOMEKA Newberry   Room: 1   Service Date: 2025        Sleep Testing Location: [facility]:     TECHNOLOGIST SLEEP STUDY PROCEDURE NOTE:   This sleep study is being conducted according to the policies and procedures outlined by the AAS accreditation standards.  The sleep study procedure and processes involved during this appointment was explained to the patient/patient’s family, questions were answered. The patient/family verbalized understanding.      The patient is a 70 y.o. year old female scheduled for a Diagnostic PSG Split night . She arrived for her appointment.      The study that was ultimately completed was a Diagnostic PSG Split night .    The full study was completed.  Patient questionnaires completed?: yes     Consents signed? yes    Initial Fall Risk Screening:     Reyna has not fallen in the last 6 months. Reyna does not have a fear of falling. She does not need assistance with sitting, standing, or walking. She does not need assistance walking in her home. She does not need assistance in an unfamiliar setting. The patient is not using an assistive device.     Brief Study observations: Patient was referred for a Split study. Patient reached sleep onset quickly. Patient did meet the criteria for a split study. The titration began using ResMed Airfit P10 Nasal Pillows Medium. Patient tolerated well. Patient was seen in left, right, and supine position. All stages of sleep were achieved.        If PAP, which was preferred mask/pressure/mode: ResMed AirFit P10 Nasal Pillows Medium/ 12 cm H20/ CPAP       After the procedure, the patient/family was informed to ensure followup with ordering clinician for testing results.      Technologist: TOMEKA Newberry

## 2025-04-09 ENCOUNTER — TELEPHONE (OUTPATIENT)
Dept: SLEEP MEDICINE | Facility: HOSPITAL | Age: 70
End: 2025-04-09
Payer: MEDICARE

## 2025-04-09 DIAGNOSIS — G47.33 OSA (OBSTRUCTIVE SLEEP APNEA): ICD-10-CM

## 2025-04-09 NOTE — TELEPHONE ENCOUNTER
Called patient and left VM regarding the availability of sleep test result.  Sleep nurse phone number [phone] - given to call back for results and plan going forward.

## 2025-04-09 NOTE — TELEPHONE ENCOUNTER
----- Message from Anastacio Norton sent at 4/9/2025 10:48 AM EDT -----  Her  sleep study is complete.  Can we order Auto-CPAP at the following settings: 9-12 cm H2O with   heated humidification via a Res Med Airfit P10 Medium and chinstrap    Then she will need a 31-90 day compliance appointment.

## 2025-04-10 ENCOUNTER — APPOINTMENT (OUTPATIENT)
Dept: SLEEP MEDICINE | Facility: CLINIC | Age: 70
End: 2025-04-10
Payer: MEDICARE

## 2025-04-14 NOTE — TELEPHONE ENCOUNTER
Called patient regarding the sleep study result. Patient agreeable to start APAP therapy with [facility]. Patient scheduled for a follow-up appointment with DR. SCOTT  between 30-90 days after CPAP initiation on 7/16/25. Patient verbalized understanding, all questions answered.

## 2025-05-29 ENCOUNTER — APPOINTMENT (OUTPATIENT)
Dept: PRIMARY CARE | Facility: CLINIC | Age: 70
End: 2025-05-29
Payer: MEDICARE

## 2025-05-29 VITALS
RESPIRATION RATE: 16 BRPM | OXYGEN SATURATION: 95 % | HEART RATE: 70 BPM | SYSTOLIC BLOOD PRESSURE: 130 MMHG | BODY MASS INDEX: 45.66 KG/M2 | DIASTOLIC BLOOD PRESSURE: 80 MMHG | WEIGHT: 266 LBS | TEMPERATURE: 97.6 F

## 2025-05-29 DIAGNOSIS — M54.50 LUMBOSACRAL PAIN: Primary | ICD-10-CM

## 2025-05-29 DIAGNOSIS — M54.50 MIDLINE LOW BACK PAIN WITHOUT SCIATICA, UNSPECIFIED CHRONICITY: ICD-10-CM

## 2025-05-29 LAB
POC APPEARANCE, URINE: CLEAR
POC BILIRUBIN, URINE: NEGATIVE
POC BLOOD, URINE: ABNORMAL
POC COLOR, URINE: YELLOW
POC GLUCOSE, URINE: NEGATIVE MG/DL
POC KETONES, URINE: NEGATIVE MG/DL
POC LEUKOCYTES, URINE: ABNORMAL
POC NITRITE,URINE: NEGATIVE
POC PH, URINE: 5.5 PH
POC PROTEIN, URINE: NEGATIVE MG/DL
POC SPECIFIC GRAVITY, URINE: 1.02
POC UROBILINOGEN, URINE: 0.2 EU/DL

## 2025-05-29 PROCEDURE — 3079F DIAST BP 80-89 MM HG: CPT | Performed by: FAMILY MEDICINE

## 2025-05-29 PROCEDURE — 1159F MED LIST DOCD IN RCRD: CPT | Performed by: FAMILY MEDICINE

## 2025-05-29 PROCEDURE — 1125F AMNT PAIN NOTED PAIN PRSNT: CPT | Performed by: FAMILY MEDICINE

## 2025-05-29 PROCEDURE — 81003 URINALYSIS AUTO W/O SCOPE: CPT | Performed by: FAMILY MEDICINE

## 2025-05-29 PROCEDURE — 99213 OFFICE O/P EST LOW 20 MIN: CPT | Performed by: FAMILY MEDICINE

## 2025-05-29 PROCEDURE — 3075F SYST BP GE 130 - 139MM HG: CPT | Performed by: FAMILY MEDICINE

## 2025-05-29 PROCEDURE — 1036F TOBACCO NON-USER: CPT | Performed by: FAMILY MEDICINE

## 2025-05-29 ASSESSMENT — ENCOUNTER SYMPTOMS
OCCASIONAL FEELINGS OF UNSTEADINESS: 0
DEPRESSION: 0
LOSS OF SENSATION IN FEET: 0

## 2025-05-29 ASSESSMENT — PAIN SCALES - GENERAL: PAINLEVEL_OUTOF10: 3

## 2025-05-29 NOTE — PROGRESS NOTES
Subjective   Patient ID: Reyna Escobedo is a 70 y.o. female who presents for Back Pain (Patient is here for back pain x 2-3 months).    HPI   Here for low back pain.  No trauma.  Gradually occurred over the past 2 to 3 months.  It is in the right lower back without radiation.  There is no numbness tingling in her leg.  She has tried ice and heat without much improvement.  No history of back problems.  Review of Systems  Denies headache, blurred vision, chest pain, shortness of breath, nausea or vomiting, change in bowel habits or leg pain or swelling.    Objective   /80 (BP Location: Left arm, Patient Position: Sitting, BP Cuff Size: Large adult)   Pulse 70   Temp 36.4 °C (97.6 °F) (Temporal)   Resp 16   Wt 121 kg (266 lb)   SpO2 95%   BMI 45.66 kg/m²     Physical Exam  Vitals and nurse's notes reviewed  General: no acute distress  HEENT: Normal  Neck: Supple  Lungs: Clear  Cardio: RRR w/o murmur  Extremities: No edema, no calf tenderness.  Hips with full range of motion and no pain  Neuro: Alert and oriented with no focal deficits  Back: Flexion extension lateral rotation all limited slightly due to discomfort.  Negative straight leg test.  No point tenderness.  She is able to walk on her heels and toes.    Assessment/Plan   Assessment & Plan  Midline low back pain without sciatica, unspecified chronicity    Orders:    POCT UA Automated manually resulted    XR lumbar spine complete 4+ views; Future    Referral to Physical Therapy; Future    Lumbosacral pain  Could be secondary to osteoarthritis or soft tissue strain.  Will get x-ray.  The meantime we will refer to physical therapy.  I will notify her results.  She is scheduled see me for physical in 2 weeks and we will recheck at that time.

## 2025-05-29 NOTE — ASSESSMENT & PLAN NOTE
Could be secondary to osteoarthritis or soft tissue strain.  Will get x-ray.  The meantime we will refer to physical therapy.  I will notify her results.  She is scheduled see me for physical in 2 weeks and we will recheck at that time.         
Normal for race

## 2025-06-02 ENCOUNTER — HOSPITAL ENCOUNTER (OUTPATIENT)
Dept: RADIOLOGY | Facility: CLINIC | Age: 70
Discharge: HOME | End: 2025-06-02
Payer: MEDICARE

## 2025-06-02 DIAGNOSIS — M54.50 MIDLINE LOW BACK PAIN WITHOUT SCIATICA, UNSPECIFIED CHRONICITY: ICD-10-CM

## 2025-06-02 PROCEDURE — 72110 X-RAY EXAM L-2 SPINE 4/>VWS: CPT

## 2025-06-02 PROCEDURE — 72110 X-RAY EXAM L-2 SPINE 4/>VWS: CPT | Performed by: RADIOLOGY

## 2025-06-03 LAB
ALBUMIN SERPL-MCNC: 4.3 G/DL (ref 3.6–5.1)
ALP SERPL-CCNC: 144 U/L (ref 37–153)
ALT SERPL-CCNC: 15 U/L (ref 6–29)
ANION GAP SERPL CALCULATED.4IONS-SCNC: 9 MMOL/L (CALC) (ref 7–17)
AST SERPL-CCNC: 18 U/L (ref 10–35)
BASOPHILS # BLD AUTO: 30 CELLS/UL (ref 0–200)
BASOPHILS NFR BLD AUTO: 0.4 %
BILIRUB SERPL-MCNC: 0.5 MG/DL (ref 0.2–1.2)
BUN SERPL-MCNC: 12 MG/DL (ref 7–25)
CALCIUM SERPL-MCNC: 8.8 MG/DL (ref 8.6–10.4)
CHLORIDE SERPL-SCNC: 104 MMOL/L (ref 98–110)
CHOLEST SERPL-MCNC: 216 MG/DL
CHOLEST/HDLC SERPL: 5 (CALC)
CO2 SERPL-SCNC: 27 MMOL/L (ref 20–32)
CREAT SERPL-MCNC: 0.65 MG/DL (ref 0.6–1)
EGFRCR SERPLBLD CKD-EPI 2021: 95 ML/MIN/1.73M2
EOSINOPHIL # BLD AUTO: 148 CELLS/UL (ref 15–500)
EOSINOPHIL NFR BLD AUTO: 2 %
ERYTHROCYTE [DISTWIDTH] IN BLOOD BY AUTOMATED COUNT: 13.1 % (ref 11–15)
EST. AVERAGE GLUCOSE BLD GHB EST-MCNC: 128 MG/DL
EST. AVERAGE GLUCOSE BLD GHB EST-SCNC: 7.1 MMOL/L
GLUCOSE SERPL-MCNC: 106 MG/DL (ref 65–99)
HBA1C MFR BLD: 6.1 %
HCT VFR BLD AUTO: 45.2 % (ref 35–45)
HDLC SERPL-MCNC: 43 MG/DL
HGB BLD-MCNC: 15.1 G/DL (ref 11.7–15.5)
LDLC SERPL CALC-MCNC: 129 MG/DL (CALC)
LYMPHOCYTES # BLD AUTO: 1909 CELLS/UL (ref 850–3900)
LYMPHOCYTES NFR BLD AUTO: 25.8 %
MCH RBC QN AUTO: 29.5 PG (ref 27–33)
MCHC RBC AUTO-ENTMCNC: 33.4 G/DL (ref 32–36)
MCV RBC AUTO: 88.5 FL (ref 80–100)
MONOCYTES # BLD AUTO: 555 CELLS/UL (ref 200–950)
MONOCYTES NFR BLD AUTO: 7.5 %
NEUTROPHILS # BLD AUTO: 4758 CELLS/UL (ref 1500–7800)
NEUTROPHILS NFR BLD AUTO: 64.3 %
NONHDLC SERPL-MCNC: 173 MG/DL (CALC)
PLATELET # BLD AUTO: 293 THOUSAND/UL (ref 140–400)
PMV BLD REES-ECKER: 9.8 FL (ref 7.5–12.5)
POTASSIUM SERPL-SCNC: 4.1 MMOL/L (ref 3.5–5.3)
PROT SERPL-MCNC: 7 G/DL (ref 6.1–8.1)
RBC # BLD AUTO: 5.11 MILLION/UL (ref 3.8–5.1)
SODIUM SERPL-SCNC: 140 MMOL/L (ref 135–146)
TRIGL SERPL-MCNC: 307 MG/DL
WBC # BLD AUTO: 7.4 THOUSAND/UL (ref 3.8–10.8)

## 2025-06-10 ENCOUNTER — TELEPHONE (OUTPATIENT)
Dept: PRIMARY CARE | Facility: CLINIC | Age: 70
End: 2025-06-10

## 2025-06-10 ENCOUNTER — APPOINTMENT (OUTPATIENT)
Dept: PRIMARY CARE | Facility: CLINIC | Age: 70
End: 2025-06-10
Payer: MEDICARE

## 2025-06-10 VITALS
RESPIRATION RATE: 18 BRPM | WEIGHT: 268 LBS | OXYGEN SATURATION: 93 % | SYSTOLIC BLOOD PRESSURE: 126 MMHG | DIASTOLIC BLOOD PRESSURE: 70 MMHG | BODY MASS INDEX: 47.48 KG/M2 | HEIGHT: 63 IN | HEART RATE: 72 BPM

## 2025-06-10 DIAGNOSIS — R73.01 IMPAIRED FASTING GLUCOSE: ICD-10-CM

## 2025-06-10 DIAGNOSIS — L29.9 PRURITUS: ICD-10-CM

## 2025-06-10 DIAGNOSIS — I10 PRIMARY HYPERTENSION: ICD-10-CM

## 2025-06-10 DIAGNOSIS — E78.00 PURE HYPERCHOLESTEROLEMIA, UNSPECIFIED: ICD-10-CM

## 2025-06-10 DIAGNOSIS — Z00.00 WELL ADULT EXAM: Primary | ICD-10-CM

## 2025-06-10 PROBLEM — H18.513 ENDOTHELIAL CORNEAL DYSTROPHY, BILATERAL: Status: ACTIVE | Noted: 2025-01-23

## 2025-06-10 PROBLEM — R19.8 TEETH CLENCHING: Status: RESOLVED | Noted: 2024-04-25 | Resolved: 2025-06-10

## 2025-06-10 PROBLEM — R05.9 COUGH: Status: RESOLVED | Noted: 2019-12-27 | Resolved: 2025-06-10

## 2025-06-10 PROBLEM — T23.009A BURN OF HAND: Status: RESOLVED | Noted: 2023-09-26 | Resolved: 2025-06-10

## 2025-06-10 PROBLEM — J06.9 VIRAL UPPER RESPIRATORY INFECTION: Status: RESOLVED | Noted: 2020-10-23 | Resolved: 2025-06-10
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
## 2025-06-10 PROBLEM — H93.8X9 EAR FULLNESS: Status: ACTIVE | Noted: 2017-06-07

## 2025-06-10 PROBLEM — N95.0 POSTMENOPAUSAL BLEEDING: Status: ACTIVE | Noted: 2025-06-10

## 2025-06-10 PROBLEM — N39.0 UTI (URINARY TRACT INFECTION): Status: RESOLVED | Noted: 2021-08-19 | Resolved: 2025-06-10

## 2025-06-10 PROBLEM — N89.8 VAGINAL DISCHARGE: Status: RESOLVED | Noted: 2020-10-02 | Resolved: 2025-06-10

## 2025-06-10 PROBLEM — N89.9 NONINFLAMMATORY DISORDER OF VAGINA: Status: ACTIVE | Noted: 2020-10-02

## 2025-06-10 PROBLEM — J00 ACUTE NASOPHARYNGITIS: Status: RESOLVED | Noted: 2019-12-27 | Resolved: 2025-06-10

## 2025-06-10 PROBLEM — N93.9 ABNORMAL UTERINE BLEEDING (AUB): Status: ACTIVE | Noted: 2025-06-10

## 2025-06-10 PROBLEM — J02.9 ACUTE PHARYNGITIS: Status: RESOLVED | Noted: 2021-06-30 | Resolved: 2025-06-10

## 2025-06-10 PROBLEM — R39.9 UTI SYMPTOMS: Status: RESOLVED | Noted: 2021-08-19 | Resolved: 2025-06-10

## 2025-06-10 PROBLEM — K85.90 PANCREATITIS (HHS-HCC): Status: RESOLVED | Noted: 2021-05-15 | Resolved: 2025-06-10

## 2025-06-10 PROBLEM — N84.0 ENDOMETRIAL POLYP: Status: ACTIVE | Noted: 2025-06-10

## 2025-06-10 PROBLEM — D62 ANEMIA DUE TO ACUTE BLOOD LOSS: Status: RESOLVED | Noted: 2023-09-26 | Resolved: 2025-06-10

## 2025-06-10 PROBLEM — H61.22 IMPACTED CERUMEN OF LEFT EAR: Status: ACTIVE | Noted: 2017-06-07

## 2025-06-10 PROCEDURE — 3008F BODY MASS INDEX DOCD: CPT | Performed by: FAMILY MEDICINE

## 2025-06-10 PROCEDURE — 3074F SYST BP LT 130 MM HG: CPT | Performed by: FAMILY MEDICINE

## 2025-06-10 PROCEDURE — 3078F DIAST BP <80 MM HG: CPT | Performed by: FAMILY MEDICINE

## 2025-06-10 PROCEDURE — 1170F FXNL STATUS ASSESSED: CPT | Performed by: FAMILY MEDICINE

## 2025-06-10 PROCEDURE — 1159F MED LIST DOCD IN RCRD: CPT | Performed by: FAMILY MEDICINE

## 2025-06-10 PROCEDURE — 1125F AMNT PAIN NOTED PAIN PRSNT: CPT | Performed by: FAMILY MEDICINE

## 2025-06-10 PROCEDURE — 1036F TOBACCO NON-USER: CPT | Performed by: FAMILY MEDICINE

## 2025-06-10 PROCEDURE — 99397 PER PM REEVAL EST PAT 65+ YR: CPT | Performed by: FAMILY MEDICINE

## 2025-06-10 PROCEDURE — G0439 PPPS, SUBSEQ VISIT: HCPCS | Performed by: FAMILY MEDICINE

## 2025-06-10 ASSESSMENT — ENCOUNTER SYMPTOMS
OCCASIONAL FEELINGS OF UNSTEADINESS: 0
LOSS OF SENSATION IN FEET: 0
DEPRESSION: 0

## 2025-06-10 ASSESSMENT — PATIENT HEALTH QUESTIONNAIRE - PHQ9
1. LITTLE INTEREST OR PLEASURE IN DOING THINGS: NOT AT ALL
SUM OF ALL RESPONSES TO PHQ9 QUESTIONS 1 AND 2: 0
2. FEELING DOWN, DEPRESSED OR HOPELESS: NOT AT ALL

## 2025-06-10 ASSESSMENT — PAIN SCALES - GENERAL: PAINLEVEL_OUTOF10: 6

## 2025-06-10 ASSESSMENT — ACTIVITIES OF DAILY LIVING (ADL)
GROCERY_SHOPPING: INDEPENDENT
MANAGING_FINANCES: INDEPENDENT
DRESSING: INDEPENDENT
DOING_HOUSEWORK: INDEPENDENT
TAKING_MEDICATION: INDEPENDENT
BATHING: INDEPENDENT

## 2025-06-10 NOTE — PROGRESS NOTES
Subjective   Reason for Visit: Reyna Escobedo is an 70 y.o. female here for a Medicare Wellness visit.     Past Medical, Surgical, and Family History reviewed and updated in chart.    Reviewed all medications by prescribing practitioner or clinical pharmacist (such as prescriptions, OTCs, herbal therapies and supplements) and documented in the medical record.    HPI    Patient Care Team:  Terrance Garcia MD as PCP - General  Anastacio Norton MD as PCP - [employer] PCP  Terrance Garcia MD as Primary Care Provider   Colonoscopy 2104 showed hemorrhoids.  Colonoscopy/EGD in 7/21 by Dr. Ansari showed a  polyp. She had a gastric sleeve placed in 2/17.      2. REYNA is seen today also for follow up of High cholesterol.  She is on no meds.  recent LDL is 125.     3. She is also here for follow up of sleep apnea. She is doing well on CPAP. She also has insomnia (sleep onset). She takes zolpidem about once per week on a p.r.n. basis.       4. REYNA is seen also for impaired fasting glucose.  Recent glucose is stable.  She is on no medication.  Recent A1c is 5.8.     5. REYNA is here also for follow up of benign essential hypertension.  She was on lisinopril 20 mg but this was stopped due to feeling light headed.  Since then her SX have improved and her BP has been stable.     6.  She is here for follow-up of GERD.  She is on omeprazole 40 mg daily.      7.  She is also here for follow-up of degenerative joint disease.  She is status post left knee replacement in 1/21  and right knee in 8/21 by Dr. Tavares.    8.  She is here for itching in her suprapubic area off-and-on for the past year.  She has tried various creams and antifungal treatments over-the-counter.  She tried cortisone cream which gives some temporary leaf.  She denies any rash.  No discharge.  Review of Systems  Denies headache, blurred vision, chest pain, shortness of breath, nausea or vomiting, change in bowel habits or leg pain or  "swelling.    Objective   Vitals:  /70 (BP Location: Left arm, Patient Position: Sitting, BP Cuff Size: Large adult)   Pulse 72   Resp 18   Ht 1.607 m (5' 3.25\")   Wt 122 kg (268 lb)   SpO2 93%   BMI 47.10 kg/m²       Physical Exam  General appearance: Comfortable.  She is overweight.  She is awake, alert, and oriented and appears her stated age.The patient is cooperative with exam.  Head: Hair pattern reveals a normal pattern for patient's age and face shows no abnormalities.  eyes: PERRLA, EOMI x2, conjunctival and sclera are clear.   Nose: No polyps, ulcerations, or lesions.  Throat: Oral mucosa reveals no abnormalities and teeth are in good repair.  Neck:  Supple without lymphadenopathy.  No thyromegaly or carotid bruits.  Lungs: Clear to auscultation bilaterally with no wheezes, rales or rhonchi.  Chest Wall: No abnormalities  Breast: Bilateral breasts are symmetrical without masses or discharge or tenderness.  Cardio:Regular rate and rhythm with no murmurs.  No friction rubs.  No carotid bruits.  Abdomen: Abdomen is soft, nontender, no masses and no hepatosplenomegaly.  Genitourinary: Labia reveal no lesions.  Vaginal mucosa reveals no abnormalities.  Cervix reveals no abnormalities.  Negative chandelier sign.  Uterus is normal in size, shape and position.Bilateral adnexa reveals no masses or tenderness.  Suprapubic area with no rash or excoriations or dry skin.  Lymph nodes: Bilateral axillary lymph nodes and inguinal nodes are unremarkable.  Musculoskeletal: Bilateral upper and lower extremities are equal in strength at 5/5.  Skin: Good turgor and no rashes.  Neurological: Intact and nonfocal.  Cranial nerves II through XII are grossly intact.  Psychiatric: Patient has appropriate judgment.  Patient has good insight.  Patient's mood is appropriate.    Assessment & Plan  Well adult exam  Anticipatory guidance given.         Primary hypertension  Blood pressure is stable so we will keep off " medication for now.  Reviewed recent creatinine/GFR which was normal.  Recheck in 6 months.         Pruritus  Pubic area is normal in appearance on examination.  We talked about various options for symptomatic relief.  She has already tried topicals as well as oral antihistamine.  She thinks there is some dryness in the skin so we will try a moisturizer.  If this does not work she can try an antifungal powder to see if extra moisture is the problem.  If these efforts do not help her symptoms then she is to let me know at that time would refer to GYN for further evaluation.         Pure hypercholesterolemia, unspecified  Keep off medication.  Improve low-fat diet.  Increase exercise.  Recheck in 6 months.

## 2025-06-10 NOTE — ASSESSMENT & PLAN NOTE
Pubic area is normal in appearance on examination.  We talked about various options for symptomatic relief.  She has already tried topicals as well as oral antihistamine.  She thinks there is some dryness in the skin so we will try a moisturizer.  If this does not work she can try an antifungal powder to see if extra moisture is the problem.  If these efforts do not help her symptoms then she is to let me know at that time would refer to GYN for further evaluation.

## 2025-06-10 NOTE — ASSESSMENT & PLAN NOTE
Blood pressure is stable so we will keep off medication for now.  Reviewed recent creatinine/GFR which was normal.  Recheck in 6 months.

## 2025-07-10 ENCOUNTER — APPOINTMENT (OUTPATIENT)
Dept: PHYSICAL THERAPY | Facility: CLINIC | Age: 70
End: 2025-07-10
Payer: MEDICARE

## 2025-07-16 ENCOUNTER — APPOINTMENT (OUTPATIENT)
Dept: SLEEP MEDICINE | Facility: CLINIC | Age: 70
End: 2025-07-16
Payer: MEDICARE

## 2025-07-25 DIAGNOSIS — M54.50 LUMBOSACRAL PAIN: Primary | ICD-10-CM

## 2025-07-25 RX ORDER — CYCLOBENZAPRINE HCL 10 MG
10 TABLET ORAL 2 TIMES DAILY PRN
Qty: 20 TABLET | Refills: 0 | Status: SHIPPED | OUTPATIENT
Start: 2025-07-25 | End: 2025-09-23

## 2025-08-14 ENCOUNTER — EVALUATION (OUTPATIENT)
Dept: PHYSICAL THERAPY | Facility: CLINIC | Age: 70
End: 2025-08-14
Payer: MEDICARE

## 2025-08-14 DIAGNOSIS — M54.50 CHRONIC BILATERAL LOW BACK PAIN: Primary | ICD-10-CM

## 2025-08-14 DIAGNOSIS — M62.81 MUSCLE WEAKNESS: ICD-10-CM

## 2025-08-14 DIAGNOSIS — G89.29 CHRONIC BILATERAL LOW BACK PAIN: Primary | ICD-10-CM

## 2025-08-14 PROCEDURE — 97161 PT EVAL LOW COMPLEX 20 MIN: CPT | Mod: GP

## 2025-08-14 PROCEDURE — 97110 THERAPEUTIC EXERCISES: CPT | Mod: GP

## 2025-08-15 ENCOUNTER — TREATMENT (OUTPATIENT)
Dept: PHYSICAL THERAPY | Facility: CLINIC | Age: 70
End: 2025-08-15
Payer: MEDICARE

## 2025-08-15 DIAGNOSIS — M62.81 MUSCLE WEAKNESS: ICD-10-CM

## 2025-08-15 DIAGNOSIS — G89.29 CHRONIC BILATERAL LOW BACK PAIN: ICD-10-CM

## 2025-08-15 DIAGNOSIS — M54.50 CHRONIC BILATERAL LOW BACK PAIN: ICD-10-CM

## 2025-08-15 PROCEDURE — 97110 THERAPEUTIC EXERCISES: CPT | Mod: GP,CQ

## 2025-08-28 ENCOUNTER — CLINICAL SUPPORT (OUTPATIENT)
Dept: PHYSICAL THERAPY | Facility: CLINIC | Age: 70
End: 2025-08-28
Payer: MEDICARE

## 2025-08-28 DIAGNOSIS — M54.50 CHRONIC BILATERAL LOW BACK PAIN: ICD-10-CM

## 2025-08-28 DIAGNOSIS — M62.81 MUSCLE WEAKNESS: ICD-10-CM

## 2025-08-28 DIAGNOSIS — G89.29 CHRONIC BILATERAL LOW BACK PAIN: ICD-10-CM

## 2025-08-28 PROCEDURE — 97110 THERAPEUTIC EXERCISES: CPT | Mod: GP

## 2025-08-28 PROCEDURE — 97035 APP MDLTY 1+ULTRASOUND EA 15: CPT | Mod: GP

## 2025-10-09 ENCOUNTER — APPOINTMENT (OUTPATIENT)
Dept: SLEEP MEDICINE | Facility: CLINIC | Age: 70
End: 2025-10-09
Payer: MEDICARE

## 2025-12-16 ENCOUNTER — APPOINTMENT (OUTPATIENT)
Dept: PRIMARY CARE | Facility: CLINIC | Age: 70
End: 2025-12-16
Payer: MEDICARE